# Patient Record
Sex: MALE | Race: WHITE | Employment: FULL TIME | URBAN - METROPOLITAN AREA
[De-identification: names, ages, dates, MRNs, and addresses within clinical notes are randomized per-mention and may not be internally consistent; named-entity substitution may affect disease eponyms.]

---

## 2017-04-20 ENCOUNTER — OFFICE VISIT (OUTPATIENT)
Dept: DERMATOLOGY | Age: 72
End: 2017-04-20

## 2017-04-20 DIAGNOSIS — L82.1 SK (SEBORRHEIC KERATOSIS): Primary | ICD-10-CM

## 2017-04-20 DIAGNOSIS — Z85.828 HISTORY OF BASAL CELL CARCINOMA: ICD-10-CM

## 2017-04-20 PROCEDURE — 99213 OFFICE O/P EST LOW 20 MIN: CPT | Performed by: DERMATOLOGY

## 2017-05-19 ENCOUNTER — TELEPHONE (OUTPATIENT)
Dept: FAMILY MEDICINE CLINIC | Age: 72
End: 2017-05-19

## 2017-05-19 RX ORDER — AMLODIPINE BESYLATE 5 MG/1
TABLET ORAL
Qty: 45 TABLET | Refills: 6 | Status: SHIPPED | OUTPATIENT
Start: 2017-05-19 | End: 2017-12-26 | Stop reason: SDUPTHER

## 2017-07-24 ENCOUNTER — TELEPHONE (OUTPATIENT)
Dept: CARDIOLOGY CLINIC | Age: 72
End: 2017-07-24

## 2017-07-24 DIAGNOSIS — I25.10 CORONARY ARTERY DISEASE INVOLVING NATIVE CORONARY ARTERY OF NATIVE HEART WITHOUT ANGINA PECTORIS: Primary | ICD-10-CM

## 2017-07-24 DIAGNOSIS — I10 ESSENTIAL HYPERTENSION: ICD-10-CM

## 2017-08-01 ENCOUNTER — HOSPITAL ENCOUNTER (OUTPATIENT)
Dept: NON INVASIVE DIAGNOSTICS | Age: 72
Discharge: HOME | End: 2017-08-01
Attending: INTERNAL MEDICINE | Admitting: INTERNAL MEDICINE

## 2017-08-10 ENCOUNTER — TELEPHONE (OUTPATIENT)
Dept: CARDIOLOGY CLINIC | Age: 72
End: 2017-08-10

## 2017-09-26 ENCOUNTER — TELEPHONE (OUTPATIENT)
Dept: FAMILY MEDICINE CLINIC | Age: 72
End: 2017-09-26

## 2017-10-05 ENCOUNTER — OFFICE VISIT (OUTPATIENT)
Dept: CARDIOLOGY CLINIC | Age: 72
End: 2017-10-05

## 2017-10-05 VITALS
WEIGHT: 170 LBS | SYSTOLIC BLOOD PRESSURE: 134 MMHG | DIASTOLIC BLOOD PRESSURE: 70 MMHG | BODY MASS INDEX: 25.1 KG/M2 | HEART RATE: 72 BPM

## 2017-10-05 DIAGNOSIS — I25.10 CORONARY ARTERY DISEASE INVOLVING NATIVE CORONARY ARTERY OF NATIVE HEART WITHOUT ANGINA PECTORIS: Primary | ICD-10-CM

## 2017-10-05 DIAGNOSIS — E78.5 HYPERLIPIDEMIA, UNSPECIFIED HYPERLIPIDEMIA TYPE: ICD-10-CM

## 2017-10-05 DIAGNOSIS — I10 ESSENTIAL HYPERTENSION: ICD-10-CM

## 2017-10-05 PROCEDURE — 99214 OFFICE O/P EST MOD 30 MIN: CPT | Performed by: INTERNAL MEDICINE

## 2017-10-05 NOTE — PROGRESS NOTES
LABVLDL 22 10/07/2016    LABVLDL 17 12/09/2015    LABVLDL 26 01/08/2014     Lipid  10/7/2016    Assessment:     Patient Active Problem List   Diagnosis    CAD (coronary artery disease)    Hyperlipidemia    Essential hypertension    Cellulitis and abscess of leg    Well adult exam    Skin lesion of face    BCC (basal cell carcinoma), face    History of AAA (abdominal aortic aneurysm) repair    Basal cell carcinoma of eye     Stress echocardiogram 8/2017    Summary   Baseline resting echocardiogram shows normal global LV systolic function   with an ejection fraction of 55% and uniform myocardial segmental wall   motion. Following stress there was uniform augmentation of all myocardial   segments with appropriate hyperdynamic LV systolic response to stress with   an ejection fraction of 65-70%.     Negative for ischemia. Assessment    CAD s/p CABG (LIMA to LAD; left radial to circumflex and obtuse marginal in 2001)    Hyperlipidemia      Hypertension essential      Plan:   Medications reviewed  Continue amlodipine  Check lipids; if LDL >70, will start rosuvastatin 20mg daily  Aspirin recommended; patient does not want to be on aspirin  All questions answered      Staff Note      Patient seen and evaluated with the medical resident. I was physically present during the critical portions of the service when performed by the resident including the assessment and management of the patient. I agree with the findings and plans as described. Not sure if he wants to take a statin.  This has been an issue in the past  Check LDL if high start statin

## 2017-10-17 DIAGNOSIS — E78.5 HYPERLIPIDEMIA, UNSPECIFIED HYPERLIPIDEMIA TYPE: ICD-10-CM

## 2017-10-17 LAB
CHOLESTEROL, TOTAL: 179 MG/DL (ref 0–199)
HDLC SERPL-MCNC: 35 MG/DL (ref 40–60)
LDL CHOLESTEROL CALCULATED: 119 MG/DL
TRIGL SERPL-MCNC: 127 MG/DL (ref 0–150)
VLDLC SERPL CALC-MCNC: 25 MG/DL

## 2017-10-26 ENCOUNTER — OFFICE VISIT (OUTPATIENT)
Dept: FAMILY MEDICINE CLINIC | Age: 72
End: 2017-10-26

## 2017-10-26 VITALS
OXYGEN SATURATION: 97 % | HEART RATE: 74 BPM | SYSTOLIC BLOOD PRESSURE: 110 MMHG | HEIGHT: 69 IN | BODY MASS INDEX: 25.03 KG/M2 | DIASTOLIC BLOOD PRESSURE: 70 MMHG | WEIGHT: 169 LBS

## 2017-10-26 DIAGNOSIS — E78.2 MIXED HYPERLIPIDEMIA: ICD-10-CM

## 2017-10-26 DIAGNOSIS — Z23 NEED FOR PNEUMOCOCCAL VACCINATION: ICD-10-CM

## 2017-10-26 DIAGNOSIS — I10 ESSENTIAL HYPERTENSION: ICD-10-CM

## 2017-10-26 DIAGNOSIS — I25.10 CORONARY ARTERY DISEASE INVOLVING NATIVE CORONARY ARTERY OF NATIVE HEART WITHOUT ANGINA PECTORIS: ICD-10-CM

## 2017-10-26 DIAGNOSIS — Z00.00 WELL ADULT EXAM: Primary | ICD-10-CM

## 2017-10-26 PROCEDURE — 99397 PER PM REEVAL EST PAT 65+ YR: CPT | Performed by: FAMILY MEDICINE

## 2017-10-26 PROCEDURE — 90471 IMMUNIZATION ADMIN: CPT | Performed by: FAMILY MEDICINE

## 2017-10-26 PROCEDURE — 90670 PCV13 VACCINE IM: CPT | Performed by: FAMILY MEDICINE

## 2017-10-26 RX ORDER — ATORVASTATIN CALCIUM 10 MG/1
10 TABLET, FILM COATED ORAL DAILY
Qty: 30 TABLET | Refills: 5 | Status: SHIPPED | OUTPATIENT
Start: 2017-10-26 | End: 2018-05-04 | Stop reason: SDUPTHER

## 2017-10-26 RX ORDER — NITROGLYCERIN 0.3 MG/1
0.3 TABLET SUBLINGUAL EVERY 5 MIN PRN
Qty: 30 TABLET | Refills: 3 | Status: SHIPPED | OUTPATIENT
Start: 2017-10-26

## 2017-10-26 ASSESSMENT — PATIENT HEALTH QUESTIONNAIRE - PHQ9
SUM OF ALL RESPONSES TO PHQ QUESTIONS 1-9: 0
1. LITTLE INTEREST OR PLEASURE IN DOING THINGS: 0
SUM OF ALL RESPONSES TO PHQ9 QUESTIONS 1 & 2: 0
2. FEELING DOWN, DEPRESSED OR HOPELESS: 0

## 2017-10-26 NOTE — PATIENT INSTRUCTIONS
Patient Self-Management Goal for Chronic Condition  Goal: I will follow the diet recommendations provided by my doctor: low fat, low cholesterol, substitute healthy fats, such as olive oil, canola oil, grapeseed oil for saturated fats like butter, margarine, and shortening, minimize processed foods and reduce sodium intake. I will take all medications as prescribed by my doctor, and I will call the office if I am having any medication problems.   Barriers to success: none  Plan for overcoming my barriers: N/A     Confidence: 9/10  Date goal set: 10/26/17  Date goal attained:

## 2017-10-26 NOTE — PROGRESS NOTES
Indications: Per PT      Flaxseed, Linseed, (FLAX SEED OIL) 1000 MG CAPS Take 1,000 mg by mouth daily      GARLIC PO Take by mouth      therapeutic multivitamin-minerals (THERAGRAN-M) tablet Take 1 tablet by mouth daily.  Vitamin D (CHOLECALCIFEROL) 1000 UNITS CAPS capsule Take 1,000 Units by mouth daily. Past Medical History:   Diagnosis Date    BCC (basal cell carcinoma)RT medial periorbital region     CAD (coronary artery disease)     arrested after AAA repair    Cellulitis and abscess of leg 7/18/2012    Cellulitis of hand 12/13/2010    Hyperlipidemia     Hypertension     MRSA infection 7/18/2012    LLE     Past Surgical History:   Procedure Laterality Date    ABDOMINAL AORTIC ANEURYSM REPAIR  01    CORONARY ARTERY BYPASS GRAFT  01    LEG DEBRIDEMENT  07/19/2012    EXCISIONAL DEBRIDEMENT OF LEFT LEG WOUND WITH WASHOUT     Family History   Problem Relation Age of Onset    Stroke Mother     Cancer Father      stomach    Heart Disease Father     High Cholesterol Sister     Cancer Sister      pancreatic    Heart Disease Brother      bypass X2    Heart Disease Brother     Arthritis Sister      SLE     Social History     Social History    Marital status: Single     Spouse name: N/A    Number of children: N/A    Years of education: N/A     Occupational History          Social History Main Topics    Smoking status: Former Smoker     Years: 35.00    Smokeless tobacco: Never Used    Alcohol use No    Drug use: Unknown    Sexual activity: Not Currently     Other Topics Concern    Not on file     Social History Narrative    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     Physical Exam:   Vitals:    10/26/17 1105   BP: 110/70   Pulse: 74   SpO2: 97%   Weight: 169 lb (76.7 kg)   Height: 5' 9\" (1.753 m)     Body mass index is 24.96 plan--TOS and SE discussed    Hyperlipidemia  Stable--same plan--TOS and SE    Well adult exam  Labs and prevnar    Bryson Garcia received counseling on the following healthy behaviors: nutrition and medication adherence    Patient given educational materials on Hyperlipidemia and Hypertension    I have instructed Bryson Garcia to complete a self tracking handout on Blood Pressures  and instructed them to bring it with them to his next appointment. Discussed use, benefit, and side effects of prescribed medications. Barriers to medication compliance addressed. All patient questions answered. Pt voiced understanding.

## 2017-10-27 ENCOUNTER — TELEPHONE (OUTPATIENT)
Dept: FAMILY MEDICINE CLINIC | Age: 72
End: 2017-10-27

## 2017-11-10 ENCOUNTER — TELEPHONE (OUTPATIENT)
Dept: FAMILY MEDICINE CLINIC | Age: 72
End: 2017-11-10

## 2017-11-10 RX ORDER — CYCLOBENZAPRINE HCL 5 MG
5 TABLET ORAL 3 TIMES DAILY PRN
Qty: 20 TABLET | Refills: 1 | Status: SHIPPED | OUTPATIENT
Start: 2017-11-10 | End: 2017-11-28 | Stop reason: SDUPTHER

## 2017-11-10 RX ORDER — NAPROXEN 500 MG/1
500 TABLET ORAL 2 TIMES DAILY WITH MEALS
Qty: 30 TABLET | Refills: 3 | Status: SHIPPED | OUTPATIENT
Start: 2017-11-10 | End: 2019-08-30

## 2017-11-10 NOTE — TELEPHONE ENCOUNTER
Patient says that he has shooting pain going down left leg. Is asking for a muscle relaxer for the pain. Is see a chiropractor.     Please advise  SMITHA [phone]

## 2017-11-28 RX ORDER — CYCLOBENZAPRINE HCL 5 MG
5 TABLET ORAL 3 TIMES DAILY PRN
Qty: 20 TABLET | Refills: 1 | Status: SHIPPED | OUTPATIENT
Start: 2017-11-28 | End: 2017-12-08

## 2017-12-26 RX ORDER — AMLODIPINE BESYLATE 5 MG/1
TABLET ORAL
Qty: 45 TABLET | Refills: 6 | Status: SHIPPED | OUTPATIENT
Start: 2017-12-26 | End: 2018-07-30 | Stop reason: SDUPTHER

## 2018-02-13 ENCOUNTER — TELEPHONE (OUTPATIENT)
Dept: CARDIOLOGY CLINIC | Age: 73
End: 2018-02-13

## 2018-05-04 RX ORDER — ATORVASTATIN CALCIUM 10 MG/1
TABLET, FILM COATED ORAL
Qty: 30 TABLET | Refills: 5 | Status: SHIPPED | OUTPATIENT
Start: 2018-05-04 | End: 2018-11-07 | Stop reason: SDUPTHER

## 2018-07-30 ENCOUNTER — TELEPHONE (OUTPATIENT)
Dept: FAMILY MEDICINE CLINIC | Age: 73
End: 2018-07-30

## 2018-07-30 RX ORDER — AMLODIPINE BESYLATE 5 MG/1
TABLET ORAL
Qty: 45 TABLET | Refills: 6 | Status: SHIPPED | OUTPATIENT
Start: 2018-07-30 | End: 2019-03-07 | Stop reason: SDUPTHER

## 2018-07-30 NOTE — TELEPHONE ENCOUNTER
[phone]   Mckinley Han    Requesting script for     LODIPine (NORVASC) 5 MG tablet  TAKE 1 AND 1/2 TABLETS BY MOUTH ONCE DAILY, Disp-45 tablet, [address] [place] [address] [phone] Sharlene Caldwell [phone]    Last seen for physical 10/26/2017    Please advise patient

## 2018-08-24 ENCOUNTER — OFFICE VISIT (OUTPATIENT)
Dept: FAMILY MEDICINE CLINIC | Age: 73
End: 2018-08-24

## 2018-08-24 VITALS
SYSTOLIC BLOOD PRESSURE: 120 MMHG | DIASTOLIC BLOOD PRESSURE: 80 MMHG | HEART RATE: 74 BPM | OXYGEN SATURATION: 95 % | HEIGHT: 69 IN | BODY MASS INDEX: 25.2 KG/M2 | WEIGHT: 170.1 LBS

## 2018-08-24 DIAGNOSIS — Z23 NEED FOR PNEUMOCOCCAL VACCINE: ICD-10-CM

## 2018-08-24 DIAGNOSIS — E55.9 VITAMIN D DEFICIENCY: ICD-10-CM

## 2018-08-24 DIAGNOSIS — I10 ESSENTIAL HYPERTENSION: ICD-10-CM

## 2018-08-24 DIAGNOSIS — E78.2 MIXED HYPERLIPIDEMIA: ICD-10-CM

## 2018-08-24 DIAGNOSIS — I25.10 CORONARY ARTERY DISEASE INVOLVING NATIVE CORONARY ARTERY OF NATIVE HEART WITHOUT ANGINA PECTORIS: ICD-10-CM

## 2018-08-24 DIAGNOSIS — Z00.00 WELL ADULT EXAM: Primary | ICD-10-CM

## 2018-08-24 LAB
A/G RATIO: 1.4 (ref 1.1–2.2)
ALBUMIN SERPL-MCNC: 4.7 G/DL (ref 3.4–5)
ALP BLD-CCNC: 56 U/L (ref 40–129)
ALT SERPL-CCNC: 18 U/L (ref 10–40)
ANION GAP SERPL CALCULATED.3IONS-SCNC: 11 MMOL/L (ref 3–16)
AST SERPL-CCNC: 27 U/L (ref 15–37)
BASOPHILS ABSOLUTE: 0 K/UL (ref 0–0.2)
BASOPHILS RELATIVE PERCENT: 0.5 %
BILIRUB SERPL-MCNC: 0.6 MG/DL (ref 0–1)
BUN BLDV-MCNC: 7 MG/DL (ref 7–20)
CALCIUM SERPL-MCNC: 9.3 MG/DL (ref 8.3–10.6)
CHLORIDE BLD-SCNC: 103 MMOL/L (ref 99–110)
CHOLESTEROL, TOTAL: 137 MG/DL (ref 0–199)
CO2: 28 MMOL/L (ref 21–32)
CREAT SERPL-MCNC: 0.6 MG/DL (ref 0.8–1.3)
EOSINOPHILS ABSOLUTE: 0.2 K/UL (ref 0–0.6)
EOSINOPHILS RELATIVE PERCENT: 3.9 %
GFR AFRICAN AMERICAN: >60
GFR NON-AFRICAN AMERICAN: >60
GLOBULIN: 3.3 G/DL
GLUCOSE BLD-MCNC: 106 MG/DL (ref 70–99)
HCT VFR BLD CALC: 45.8 % (ref 40.5–52.5)
HDLC SERPL-MCNC: 35 MG/DL (ref 40–60)
HEMOGLOBIN: 15.2 G/DL (ref 13.5–17.5)
LDL CHOLESTEROL CALCULATED: 82 MG/DL
LYMPHOCYTES ABSOLUTE: 1.5 K/UL (ref 1–5.1)
LYMPHOCYTES RELATIVE PERCENT: 36.6 %
MCH RBC QN AUTO: 33.8 PG (ref 26–34)
MCHC RBC AUTO-ENTMCNC: 33.3 G/DL (ref 31–36)
MCV RBC AUTO: 101.6 FL (ref 80–100)
MONOCYTES ABSOLUTE: 0.9 K/UL (ref 0–1.3)
MONOCYTES RELATIVE PERCENT: 21.2 %
NEUTROPHILS ABSOLUTE: 1.6 K/UL (ref 1.7–7.7)
NEUTROPHILS RELATIVE PERCENT: 37.8 %
PDW BLD-RTO: 13.8 % (ref 12.4–15.4)
PLATELET # BLD: 114 K/UL (ref 135–450)
PMV BLD AUTO: 9.3 FL (ref 5–10.5)
POTASSIUM SERPL-SCNC: 4.3 MMOL/L (ref 3.5–5.1)
RBC # BLD: 4.5 M/UL (ref 4.2–5.9)
SODIUM BLD-SCNC: 142 MMOL/L (ref 136–145)
TOTAL CK: 221 U/L (ref 39–308)
TOTAL PROTEIN: 8 G/DL (ref 6.4–8.2)
TRIGL SERPL-MCNC: 99 MG/DL (ref 0–150)
VITAMIN D 25-HYDROXY: 32.6 NG/ML
VLDLC SERPL CALC-MCNC: 20 MG/DL
WBC # BLD: 4.2 K/UL (ref 4–11)

## 2018-08-24 PROCEDURE — 90732 PPSV23 VACC 2 YRS+ SUBQ/IM: CPT | Performed by: FAMILY MEDICINE

## 2018-08-24 PROCEDURE — 99397 PER PM REEVAL EST PAT 65+ YR: CPT | Performed by: FAMILY MEDICINE

## 2018-08-24 PROCEDURE — G0009 ADMIN PNEUMOCOCCAL VACCINE: HCPCS | Performed by: FAMILY MEDICINE

## 2018-08-24 NOTE — PROGRESS NOTES
History and Physical      Janel Quick  YOB: 1945    Date of Service:  8/24/2018    Chief Complaint:   Janel Quick is a 72 y.o. male who presents for complete physical examination.     HPI: cpx  No cc    Wt Readings from Last 3 Encounters:   08/24/18 170 lb 1.6 oz (77.2 kg)   10/26/17 169 lb (76.7 kg)   10/05/17 170 lb (77.1 kg)     BP Readings from Last 3 Encounters:   08/24/18 120/80   10/26/17 110/70   10/05/17 134/70       Patient Active Problem List   Diagnosis    CAD (coronary artery disease)    Hyperlipidemia    Essential hypertension    Well adult exam    BCC (basal cell carcinoma), face    History of AAA (abdominal aortic aneurysm) repair    Basal cell carcinoma of eye       Preventive Care:  Health Maintenance   Topic Date Due    Hepatitis C screen  1945    Shingles Vaccine (1 of 2 - 2 Dose Series) 08/22/1997    Potassium monitoring  10/07/2017    Creatinine monitoring  10/07/2017    Colon cancer screen colonoscopy  11/09/2018 (Originally 8/22/1997)    Flu vaccine (1) 09/01/2018    DTaP/Tdap/Td vaccine (2 - Td) 11/09/2020    Lipid screen  10/17/2022    Pneumococcal low/med risk  Completed    AAA screen  Completed      Self-testicular exams: Yes  Sexual activity: none   Last eye exam: 2018, normal  Exercise: walks 5 time(s) per week  Seatbelt use: +  Lipid panel:    Lab Results   Component Value Date    CHOL 179 10/17/2017    TRIG 127 10/17/2017    HDL 35 (L) 10/17/2017    LDLCALC 119 (H) 10/17/2017       Living will: yes,   copy requested    Immunization History   Administered Date(s) Administered    Pneumococcal 13-valent Conjugate ([id number]) 10/26/2017    Pneumococcal Polysaccharide ([id number]) 08/24/2018    Tdap (Boostrix, Adacel) 11/09/2010       No Known Allergies  Outpatient Prescriptions Marked as Taking for the 8/24/18 encounter (Office Visit) with Jeremias Mnceil MD   Medication Sig Dispense Refill    amLODIPine (NORVASC) 5 MG tablet Topics Concern    Not on file     Social History Narrative    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 Physical Exam:   Vitals:    08/24/18 1016   BP: 120/80   Pulse: 74   SpO2: 95%   Weight: 170 lb 1.6 oz (77.2 kg)   Height: 5' 9\" (1.753 m)     Body mass index is 25.12 kg/m². Constitutional: He is oriented to person, place, and time. He appears well-developed and well-nourished. No distress. HEENT:   Head: Normocephalic and atraumatic. Right Ear: Tympanic membrane, external ear and ear canal normal.   Left Ear: Tympanic membrane, external ear and ear canal normal.   Nose: Nose normal.   Mouth/Throat: Oropharynx is clear and moist and mucous membranes are normal. No oropharyngeal exudate or posterior oropharyngeal erythema. He has no cervical adenopathy. Eyes: Conjunctivae and extraocular motions are normal. Pupils are equal, round, and reactive to light. Neck: Full passive range of motion without pain. Neck supple. No JVD present. Carotid bruit is not present. No mass and no thyromegaly present. Cardiovascular: Normal rate, regular rhythm, normal heart sounds and intact distal pulses. Exam reveals no gallop and no friction rub. No murmur heard. Pulmonary/Chest: Effort normal and breath sounds normal. No respiratory distress. He has no wheezes, rhonchi or rales. Abdominal: Soft, non-tender. Bowel sounds and aorta are normal. There is no organomegaly, mass or bruit. Genitourinary:  no inguinal lymphadenopathy. Musculoskeletal: Normal range of motion, no synovitis. He exhibits no edema. Neurological: He is alert and oriented to person, place, and time. He has normal reflexes. No cranial nerve deficit. Coordination normal.   Skin: Skin is warm, dry and intact. No suspicious lesions are noted. Psychiatric: He has a normal mood and affect.  His speech is normal and

## 2018-08-24 NOTE — PATIENT INSTRUCTIONS
Patient Self-Management Goal for Chronic Condition  Goal: I will follow the diet recommendations provided by my doctor: low fat, low cholesterol, substitute healthy fats, such as olive oil, canola oil, grapeseed oil for saturated fats like butter, margarine, and shortening and minimize processed foods. I will take all medications as prescribed by my doctor, and I will call the office if I am having any medication problems.   Barriers to success: none  Plan for overcoming my barriers: N/A     Confidence: 9/10  Date goal set: 8/24/18  Date goal attained:

## 2018-08-31 ENCOUNTER — TELEPHONE (OUTPATIENT)
Dept: FAMILY MEDICINE CLINIC | Age: 73
End: 2018-08-31

## 2018-11-07 RX ORDER — ATORVASTATIN CALCIUM 10 MG/1
TABLET, FILM COATED ORAL
Qty: 30 TABLET | Refills: 5 | Status: SHIPPED | OUTPATIENT
Start: 2018-11-07 | End: 2019-08-30

## 2019-03-07 RX ORDER — AMLODIPINE BESYLATE 5 MG/1
TABLET ORAL
Qty: 45 TABLET | Refills: 6 | Status: SHIPPED | OUTPATIENT
Start: 2019-03-07 | End: 2019-08-30

## 2019-08-30 ENCOUNTER — OFFICE VISIT (OUTPATIENT)
Dept: FAMILY MEDICINE CLINIC | Age: 74
End: 2019-08-30
Payer: MEDICARE

## 2019-08-30 VITALS
WEIGHT: 172.2 LBS | BODY MASS INDEX: 25.43 KG/M2 | SYSTOLIC BLOOD PRESSURE: 138 MMHG | DIASTOLIC BLOOD PRESSURE: 76 MMHG | OXYGEN SATURATION: 97 % | HEART RATE: 87 BPM

## 2019-08-30 DIAGNOSIS — I25.10 CORONARY ARTERY DISEASE INVOLVING NATIVE CORONARY ARTERY OF NATIVE HEART WITHOUT ANGINA PECTORIS: ICD-10-CM

## 2019-08-30 DIAGNOSIS — E78.2 MIXED HYPERLIPIDEMIA: ICD-10-CM

## 2019-08-30 DIAGNOSIS — I10 ESSENTIAL HYPERTENSION: ICD-10-CM

## 2019-08-30 PROCEDURE — 99213 OFFICE O/P EST LOW 20 MIN: CPT | Performed by: FAMILY MEDICINE

## 2019-08-30 PROCEDURE — G8419 CALC BMI OUT NRM PARAM NOF/U: HCPCS | Performed by: FAMILY MEDICINE

## 2019-08-30 PROCEDURE — 4040F PNEUMOC VAC/ADMIN/RCVD: CPT | Performed by: FAMILY MEDICINE

## 2019-08-30 PROCEDURE — G8599 NO ASA/ANTIPLAT THER USE RNG: HCPCS | Performed by: FAMILY MEDICINE

## 2019-08-30 PROCEDURE — 1036F TOBACCO NON-USER: CPT | Performed by: FAMILY MEDICINE

## 2019-08-30 PROCEDURE — 1123F ACP DISCUSS/DSCN MKR DOCD: CPT | Performed by: FAMILY MEDICINE

## 2019-08-30 PROCEDURE — G8427 DOCREV CUR MEDS BY ELIG CLIN: HCPCS | Performed by: FAMILY MEDICINE

## 2019-08-30 PROCEDURE — 3017F COLORECTAL CA SCREEN DOC REV: CPT | Performed by: FAMILY MEDICINE

## 2019-08-30 ASSESSMENT — PATIENT HEALTH QUESTIONNAIRE - PHQ9
SUM OF ALL RESPONSES TO PHQ9 QUESTIONS 1 & 2: 0
SUM OF ALL RESPONSES TO PHQ QUESTIONS 1-9: 0
1. LITTLE INTEREST OR PLEASURE IN DOING THINGS: 0
2. FEELING DOWN, DEPRESSED OR HOPELESS: 0
SUM OF ALL RESPONSES TO PHQ QUESTIONS 1-9: 0

## 2019-08-30 ASSESSMENT — ENCOUNTER SYMPTOMS
ABDOMINAL DISTENTION: 0
EYE REDNESS: 0
STRIDOR: 0
BLOOD IN STOOL: 0
BACK PAIN: 0
RHINORRHEA: 0
COUGH: 0
CHEST TIGHTNESS: 0
PHOTOPHOBIA: 0
ANAL BLEEDING: 0
EYE PAIN: 0
APNEA: 0
ABDOMINAL PAIN: 0
EYE DISCHARGE: 0
SHORTNESS OF BREATH: 0
SINUS PRESSURE: 0
FACIAL SWELLING: 0
WHEEZING: 0
CHOKING: 0
COLOR CHANGE: 0
EYE ITCHING: 0

## 2019-08-30 NOTE — PROGRESS NOTES
next appointment. Discussed use, benefit, and side effects of prescribed medications. Barriers to medication compliance addressed. All patient questions answered. Pt voiced understanding.

## 2019-10-17 ENCOUNTER — OFFICE VISIT (OUTPATIENT)
Dept: FAMILY MEDICINE CLINIC | Age: 74
End: 2019-10-17
Payer: MEDICARE

## 2019-10-17 VITALS
DIASTOLIC BLOOD PRESSURE: 80 MMHG | SYSTOLIC BLOOD PRESSURE: 138 MMHG | BODY MASS INDEX: 25.36 KG/M2 | OXYGEN SATURATION: 97 % | HEIGHT: 69 IN | WEIGHT: 171.2 LBS | HEART RATE: 93 BPM

## 2019-10-17 DIAGNOSIS — E78.2 MIXED HYPERLIPIDEMIA: ICD-10-CM

## 2019-10-17 DIAGNOSIS — I10 ESSENTIAL HYPERTENSION: ICD-10-CM

## 2019-10-17 DIAGNOSIS — Z00.00 WELL ADULT EXAM: ICD-10-CM

## 2019-10-17 DIAGNOSIS — I25.10 CORONARY ARTERY DISEASE INVOLVING NATIVE CORONARY ARTERY OF NATIVE HEART WITHOUT ANGINA PECTORIS: ICD-10-CM

## 2019-10-17 DIAGNOSIS — Z00.00 ROUTINE GENERAL MEDICAL EXAMINATION AT A HEALTH CARE FACILITY: Primary | ICD-10-CM

## 2019-10-17 DIAGNOSIS — Z00.00 ROUTINE GENERAL MEDICAL EXAMINATION AT A HEALTH CARE FACILITY: ICD-10-CM

## 2019-10-17 LAB
A/G RATIO: 1.4 (ref 1.1–2.2)
ALBUMIN SERPL-MCNC: 4.7 G/DL (ref 3.4–5)
ALP BLD-CCNC: 50 U/L (ref 40–129)
ALT SERPL-CCNC: 15 U/L (ref 10–40)
ANION GAP SERPL CALCULATED.3IONS-SCNC: 12 MMOL/L (ref 3–16)
AST SERPL-CCNC: 23 U/L (ref 15–37)
BASOPHILS ABSOLUTE: 0 K/UL (ref 0–0.2)
BASOPHILS RELATIVE PERCENT: 0.6 %
BILIRUB SERPL-MCNC: 0.7 MG/DL (ref 0–1)
BUN BLDV-MCNC: 12 MG/DL (ref 7–20)
CALCIUM SERPL-MCNC: 9.4 MG/DL (ref 8.3–10.6)
CHLORIDE BLD-SCNC: 101 MMOL/L (ref 99–110)
CHOLESTEROL, TOTAL: 166 MG/DL (ref 0–199)
CO2: 29 MMOL/L (ref 21–32)
CREAT SERPL-MCNC: 0.7 MG/DL (ref 0.8–1.3)
EOSINOPHILS ABSOLUTE: 0.1 K/UL (ref 0–0.6)
EOSINOPHILS RELATIVE PERCENT: 1.7 %
GFR AFRICAN AMERICAN: >60
GFR NON-AFRICAN AMERICAN: >60
GLOBULIN: 3.3 G/DL
GLUCOSE BLD-MCNC: 105 MG/DL (ref 70–99)
HCT VFR BLD CALC: 45.7 % (ref 40.5–52.5)
HDLC SERPL-MCNC: 38 MG/DL (ref 40–60)
HEMOGLOBIN: 15.3 G/DL (ref 13.5–17.5)
LDL CHOLESTEROL CALCULATED: 106 MG/DL
LYMPHOCYTES ABSOLUTE: 1.2 K/UL (ref 1–5.1)
LYMPHOCYTES RELATIVE PERCENT: 28.9 %
MCH RBC QN AUTO: 32.8 PG (ref 26–34)
MCHC RBC AUTO-ENTMCNC: 33.6 G/DL (ref 31–36)
MCV RBC AUTO: 97.7 FL (ref 80–100)
MONOCYTES ABSOLUTE: 0.9 K/UL (ref 0–1.3)
MONOCYTES RELATIVE PERCENT: 22.1 %
NEUTROPHILS ABSOLUTE: 1.9 K/UL (ref 1.7–7.7)
NEUTROPHILS RELATIVE PERCENT: 46.7 %
PDW BLD-RTO: 13.4 % (ref 12.4–15.4)
PLATELET # BLD: 91 K/UL (ref 135–450)
PMV BLD AUTO: 8.7 FL (ref 5–10.5)
POTASSIUM SERPL-SCNC: 4.5 MMOL/L (ref 3.5–5.1)
RBC # BLD: 4.68 M/UL (ref 4.2–5.9)
SODIUM BLD-SCNC: 142 MMOL/L (ref 136–145)
TOTAL PROTEIN: 8 G/DL (ref 6.4–8.2)
TRIGL SERPL-MCNC: 111 MG/DL (ref 0–150)
VLDLC SERPL CALC-MCNC: 22 MG/DL
WBC # BLD: 4.1 K/UL (ref 4–11)

## 2019-10-17 PROCEDURE — G8484 FLU IMMUNIZE NO ADMIN: HCPCS | Performed by: FAMILY MEDICINE

## 2019-10-17 PROCEDURE — G8599 NO ASA/ANTIPLAT THER USE RNG: HCPCS | Performed by: FAMILY MEDICINE

## 2019-10-17 PROCEDURE — 1123F ACP DISCUSS/DSCN MKR DOCD: CPT | Performed by: FAMILY MEDICINE

## 2019-10-17 PROCEDURE — 4040F PNEUMOC VAC/ADMIN/RCVD: CPT | Performed by: FAMILY MEDICINE

## 2019-10-17 PROCEDURE — 3017F COLORECTAL CA SCREEN DOC REV: CPT | Performed by: FAMILY MEDICINE

## 2019-10-17 PROCEDURE — G0439 PPPS, SUBSEQ VISIT: HCPCS | Performed by: FAMILY MEDICINE

## 2019-10-17 ASSESSMENT — PATIENT HEALTH QUESTIONNAIRE - PHQ9
SUM OF ALL RESPONSES TO PHQ QUESTIONS 1-9: 0
SUM OF ALL RESPONSES TO PHQ QUESTIONS 1-9: 0

## 2019-10-17 ASSESSMENT — LIFESTYLE VARIABLES: HOW OFTEN DO YOU HAVE A DRINK CONTAINING ALCOHOL: 0

## 2020-12-17 ENCOUNTER — OFFICE VISIT (OUTPATIENT)
Dept: FAMILY MEDICINE CLINIC | Age: 75
End: 2020-12-17
Payer: MEDICARE

## 2020-12-17 ENCOUNTER — HOSPITAL ENCOUNTER (OUTPATIENT)
Dept: GENERAL RADIOLOGY | Age: 75
Discharge: HOME | End: 2020-12-17
Payer: MEDICARE

## 2020-12-17 ENCOUNTER — HOSPITAL ENCOUNTER (OUTPATIENT)
Age: 75
Discharge: HOME | End: 2020-12-17
Payer: MEDICARE

## 2020-12-17 VITALS — BODY MASS INDEX: 25.92 KG/M2 | WEIGHT: 175 LBS | HEIGHT: 69 IN | TEMPERATURE: 97.7 F

## 2020-12-17 PROBLEM — M25.561 ACUTE PAIN OF RIGHT KNEE: Status: ACTIVE | Noted: 2020-12-17

## 2020-12-17 PROCEDURE — 4040F PNEUMOC VAC/ADMIN/RCVD: CPT | Performed by: NURSE PRACTITIONER

## 2020-12-17 PROCEDURE — 1036F TOBACCO NON-USER: CPT | Performed by: NURSE PRACTITIONER

## 2020-12-17 PROCEDURE — G8484 FLU IMMUNIZE NO ADMIN: HCPCS | Performed by: NURSE PRACTITIONER

## 2020-12-17 PROCEDURE — G8427 DOCREV CUR MEDS BY ELIG CLIN: HCPCS | Performed by: NURSE PRACTITIONER

## 2020-12-17 PROCEDURE — 99214 OFFICE O/P EST MOD 30 MIN: CPT | Performed by: NURSE PRACTITIONER

## 2020-12-17 PROCEDURE — G8419 CALC BMI OUT NRM PARAM NOF/U: HCPCS | Performed by: NURSE PRACTITIONER

## 2020-12-17 PROCEDURE — 3017F COLORECTAL CA SCREEN DOC REV: CPT | Performed by: NURSE PRACTITIONER

## 2020-12-17 PROCEDURE — 73562 X-RAY EXAM OF KNEE 3: CPT

## 2020-12-17 PROCEDURE — 1123F ACP DISCUSS/DSCN MKR DOCD: CPT | Performed by: NURSE PRACTITIONER

## 2020-12-17 ASSESSMENT — PATIENT HEALTH QUESTIONNAIRE - PHQ9
SUM OF ALL RESPONSES TO PHQ9 QUESTIONS 1 & 2: 0
SUM OF ALL RESPONSES TO PHQ QUESTIONS 1-9: 0
1. LITTLE INTEREST OR PLEASURE IN DOING THINGS: 0
SUM OF ALL RESPONSES TO PHQ QUESTIONS 1-9: 0
SUM OF ALL RESPONSES TO PHQ QUESTIONS 1-9: 0
2. FEELING DOWN, DEPRESSED OR HOPELESS: 0

## 2020-12-17 ASSESSMENT — ENCOUNTER SYMPTOMS
SHORTNESS OF BREATH: 0
COUGH: 0

## 2020-12-17 NOTE — PROGRESS NOTES
Subjective:      Patient ID: Kennedi Casanova is a 70 y.o. male who presents for:   Chief Complaint   Patient presents with    Knee Pain       Pt presents following mechanical fall down steps. Pt states hit R knee on concrete with most of his weight, did not hit head or lose concsiousness. Having pain with putting weight on R leg, ambulation, and bending R knee. Denies N/T, popping or clicking. Pt does report feeling of tightness, or muscle spasms, to R outer thigh. +swelling and bruising to knee. ROM decreased r/t pain. Pt has not tried any OTC meds for pain. Review of Systems   Respiratory: Negative for cough and shortness of breath. Cardiovascular: Negative for chest pain and palpitations. Musculoskeletal: Positive for gait problem and joint swelling. R knee   Neurological: Negative for dizziness, light-headedness and headaches. All other systems reviewed and are negative.     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     Family History   Problem Relation Age of Onset    Stroke Mother     Cancer Father         stomach    Heart Disease Father     High Cholesterol Sister     Cancer Sister         pancreatic    Heart Disease Brother         bypass X2    Heart Disease Brother     Arthritis Sister         SLE     Social History     Tobacco Use    Smoking status: Former Smoker Packs/day: 2.00     Years: 35.00     Pack years: 70.00     Quit date: 2001     Years since quittin.3    Smokeless tobacco: Never Used   Substance Use Topics    Alcohol use: No     No Known Allergies  Current Outpatient Medications   Medication Sig Dispense Refill    nitroGLYCERIN (NITROSTAT) 0.3 MG SL tablet Place 1 tablet under the tongue every 5 minutes as needed for Chest pain up to max of 3 total doses. If no relief after 1 dose, call 911. 30 tablet 3    Ascorbic Acid (VITAMIN C) 500 MG tablet Take 2,000 mg by mouth daily Indications: Per PT      Flaxseed, Linseed, (FLAX SEED OIL) 1000 MG CAPS Take 1,000 mg by mouth daily      GARLIC PO Take by mouth      therapeutic multivitamin-minerals (THERAGRAN-M) tablet Take 1 tablet by mouth daily.  Vitamin D (CHOLECALCIFEROL) 1000 UNITS CAPS capsule Take 1,000 Units by mouth daily. No current facility-administered medications for this visit. Patient's past medical history, surgical history, family history, medications,  andallergies  were all reviewed and updated as appropriate today. Objective:     Vitals:    20 1155   Temp: 97.7 °F (36.5 °C)     Physical Exam  Vitals signs reviewed. Constitutional:       Appearance: Normal appearance. Cardiovascular:      Rate and Rhythm: Normal rate and regular rhythm. Pulses: Normal pulses. Pulmonary:      Effort: Pulmonary effort is normal.      Breath sounds: Normal breath sounds. Musculoskeletal:         General: Swelling, tenderness and signs of injury present. Right knee: He exhibits decreased range of motion, swelling and ecchymosis. Tenderness found. Legs:    Skin:     Findings: Bruising and signs of injury present. Neurological:      Mental Status: He is alert. Mental status is at baseline. Assessment      Encounter Diagnosis   Name Primary?     Acute pain of right knee Yes       PLAN:  Health Maintenance   Topic Date Due  Hepatitis C screen  1945    Shingles Vaccine (1 of 2) 08/22/1997    Colon cancer screen colonoscopy  08/22/1997    Annual Wellness Visit (AWV)  06/23/2019    Flu vaccine (1) 09/01/2020    Potassium monitoring  10/17/2020    Creatinine monitoring  10/17/2020    DTaP/Tdap/Td vaccine (2 - Td) 11/09/2020    Lipid screen  10/17/2024    Pneumococcal 65+ years Vaccine  Completed    AAA screen  Completed    Hepatitis A vaccine  Aged Out    Hepatitis B vaccine  Aged Out    Hib vaccine  Aged Out    Meningococcal (ACWY) vaccine  Aged Out     1. Acute pain of right knee  - XR KNEE RIGHT (3 VIEWS); Future  Ref to orthopedics pending result - will call  Instructed on RICE, brace as needed for comfort    Return if symptoms worsen or fail to improve.     ALONDRA Portillo - CNP  [place]  12/17/2020

## 2020-12-18 ENCOUNTER — TELEPHONE (OUTPATIENT)
Dept: FAMILY MEDICINE CLINIC | Age: 75
End: 2020-12-18

## 2020-12-18 ENCOUNTER — TELEPHONE (OUTPATIENT)
Dept: ORTHOPEDIC SURGERY | Age: 75
End: 2020-12-18

## 2020-12-18 NOTE — TELEPHONE ENCOUNTER
----- Message from Gen Olvera sent at 12/18/2020  7:10 AM EST -----  Subject: Results Request    QUESTIONS  Which lab or imaging result is the patient calling about? X-ray results  Which provider ordered the test? Gaurav Castillo   At what location was the test performed? Date the test was performed? 2020-12-17  Additional Information for Provider? pt in extreme pain   wants results to xray and states he is in unimaginable pain   wont be able to come back into the office   would like to be contacted as quickly as possible  ---------------------------------------------------------------------------  --------------  [address]  What is the best way for the office to contact you? OK to leave message on   voicemail  Preferred Call Back Phone Number?  [phone]

## 2020-12-18 NOTE — TELEPHONE ENCOUNTER
----- Message from [employer] sent at 12/18/2020  8:31 AM EST -----  Subject: Results Request    QUESTIONS  Which lab or imaging result is the patient calling about? imaging of knee  Which provider ordered the test? Carolin Mercado   At what location was the test performed? Date the test was performed? 2020-12-17  Additional Information for Provider? Patient would like results of his   knee X-ray he is in a lot off pain.   ---------------------------------------------------------------------------  --------------  CALL BACK INFO  What is the best way for the office to contact you? OK to leave message on   voicemail  Preferred Call Back Phone Number?  [phone]

## 2020-12-22 ENCOUNTER — OFFICE VISIT (OUTPATIENT)
Dept: ORTHOPEDIC SURGERY | Age: 75
End: 2020-12-22
Payer: MEDICARE

## 2020-12-22 VITALS
HEIGHT: 69 IN | SYSTOLIC BLOOD PRESSURE: 130 MMHG | TEMPERATURE: 98.8 F | HEART RATE: 78 BPM | BODY MASS INDEX: 25.92 KG/M2 | WEIGHT: 175 LBS | DIASTOLIC BLOOD PRESSURE: 77 MMHG

## 2020-12-22 PROCEDURE — 3017F COLORECTAL CA SCREEN DOC REV: CPT | Performed by: PHYSICIAN ASSISTANT

## 2020-12-22 PROCEDURE — G8417 CALC BMI ABV UP PARAM F/U: HCPCS | Performed by: PHYSICIAN ASSISTANT

## 2020-12-22 PROCEDURE — G8484 FLU IMMUNIZE NO ADMIN: HCPCS | Performed by: PHYSICIAN ASSISTANT

## 2020-12-22 PROCEDURE — 1036F TOBACCO NON-USER: CPT | Performed by: PHYSICIAN ASSISTANT

## 2020-12-22 PROCEDURE — 99204 OFFICE O/P NEW MOD 45 MIN: CPT | Performed by: PHYSICIAN ASSISTANT

## 2020-12-22 PROCEDURE — G8427 DOCREV CUR MEDS BY ELIG CLIN: HCPCS | Performed by: PHYSICIAN ASSISTANT

## 2020-12-22 PROCEDURE — 4040F PNEUMOC VAC/ADMIN/RCVD: CPT | Performed by: PHYSICIAN ASSISTANT

## 2020-12-22 PROCEDURE — 1123F ACP DISCUSS/DSCN MKR DOCD: CPT | Performed by: PHYSICIAN ASSISTANT

## 2020-12-22 PROCEDURE — L1830 KO IMMOB CANVAS LONG PRE OTS: HCPCS | Performed by: PHYSICIAN ASSISTANT

## 2020-12-22 NOTE — PROGRESS NOTES
Socioeconomic History    Marital status: Single     Spouse name: Not on file    Number of children: Not on file    Years of education: Not on file    Highest education level: Not on file   Occupational History    Occupation:    Social Needs    Financial resource strain: Not on file    Food insecurity     Worry: Not on file     Inability: Not on file    Transportation needs     Medical: Not on file     Non-medical: Not on file   Tobacco Use    Smoking status: Former Smoker     Packs/day: 2.00     Years: 35.00     Pack years: 70.00     Quit date: 2001     Years since quittin.3    Smokeless tobacco: Never Used   Substance and Sexual Activity    Alcohol use: No    Drug use: No    Sexual activity: Not Currently   Lifestyle    Physical activity     Days per week: Not on file     Minutes per session: Not on file    Stress: Not on file   Relationships    Social connections     Talks on phone: Not on file     Gets together: Not on file     Attends [employer] service: Not on file     Active member of club or organization: Not on file     Attends meetings of clubs or organizations: Not on file     Relationship status: Not on file    Intimate partner violence     Fear of current or ex partner: Not on file     Emotionally abused: Not on file     Physically abused: Not on file     Forced sexual activity: Not on file   Other Topics Concern    Not on file   Social History Narrative    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 No distress. ____  HENT:   Head: Normocephalic and atraumatic. ____  Eyes: Conjunctivae are normal. ________  Cardiovascular: Intact distal pulses. ____  Pulmonary/Chest: Effort normal. ________________________  Neurological: She is alert and oriented to person, place, and time.  ____ Skin: Skin is dry. She is not diaphoretic. ____  Psychiatric: Mood, affect and judgment normal. ______          Assessment   Vital Signs:      Vitals:    12/22/20 1230   BP: 130/77   Pulse: 78   Temp: 98.8 °F (37.1 °C)       right Knee shows evidence for DJD with no obvious pseudolaxity, pain with weight bearing, antalgic gait and palpable osteophytes. Significant contusion    Inspection: Moderate anterior swelling. Swelling is present with moderate effusion. The posterior aspect of the knee appears to be full with tenderness. There is no erythema, rash, or ecchymosis. Range of Motion:  Right flexion can get to 100 unable to perform any extension on his own with assistance can get to 0. Left 0 to 130 degrees     Pain with flexion extension testing as well as varus and valgus testing    There is deformity no noted    Strength:  Hamstrings rated: 4/5. Quadriceps rated: 0/5    Palpation: There is moderate tenderness along the quadriceps tendon proximal patellar attachment. Special Tests: Patellar Compression test is positive. Valgus & Varus test is positive. Skin: There are no rashes, ulcerations or lesions. Gait: Gait pattern is antalgic  Skin shows no rashes/ecchymosis to the affected area, no hyperesthesias, no discoloration, no temperature or color discrepancies. NEUROLOGICALLY: There is no evidence for sensory or motor deficits in the extremity. Coordination appears full with no spacticity or rigidity. Reflexes appear to be symmetric. Distal circulation intact. No signs of RSD.        Additional Comments: Diffuse contusions noted throughout entire lower extremity on the right side up into the pelvis tenderness to palpation in the inguinal area         Procedure(s): No new procedure performed at today's date    Diagnostic Test Findings:   Xray   Have reviewed the xrays above from 12/17/20 Three-view x-ray of the right knee AP, lateral and sunrise views were performed on 12/17/2020 and my impression is: No evidence of acute fracture dislocation significant hematoma noted to proximal patellar area and concerns for quadriceps tear    Assessment and Plan:       Diagnosis Orders   1. Acute pain of right knee  MRI KNEE RIGHT WO CONTRAST    Breg Knee Immobilizer Brace   2. History of abdominal aortic aneurysm (AAA)  CT PELVIS WO CONTRAST Additional Contrast? None              The orders below, if any, were placed during this visit:   Orders Placed This Encounter   Procedures    MRI KNEE RIGHT WO CONTRAST     Standing Status:   Future     Standing Expiration Date:   12/22/2021     Scheduling Instructions:      Schedule with [facility] Once approved     Order Specific Question:   Reason for exam:     Answer:   rule out quad tear    CT PELVIS WO CONTRAST Additional Contrast? None     Standing Status:   Future     Standing Expiration Date:   12/22/2021     Scheduling Instructions:      Scheduled with [facility] once approved     Order Specific Question:   Additional Contrast?     Answer:   None     Order Specific Question:   Reason for exam:     Answer:   rule out damage to AAA repair    Breg Knee Immobilizer Brace     Patient was prescribed a Breg Knee Immobilizer. The right knee will require stabilization / immobilization from this semi-rigid / rigid orthosis to improve their function. The orthosis will assist in protecting the affected area, provide functional support and facilitate healing. The prefabricated orthosis was modified in the following manner to provide a customizable fit for the patient at the time of delivery. 1. Identification of appropriate positioning and alignment of anatomical landmarks. 2. Trimming of straps and panels. Reassemble orthosis to specifically fit patient. The patient was educated and fit by a healthcare professional with expert knowledge and specialization in brace application while under the direct supervision of the treating physician. Verbal and written instructions for the use of and application of this item were provided. They were instructed to contact the office immediately should the brace result in increased pain, decreased sensation, increased swelling or worsening of the condition. Treatment Plan:   -Ordered MRI of the right knee for evaluation of quadriceps tear.  -Ordered CT scan of the pelvis to help rule out any fracture to the AAA repair from 20 years ago. -Place patient into an immobilizer knee brace and advised to continue utilization of walker and the brace on any point in time when he is walking or standing.  -Advised patient to rest ice and elevate as much as possible and continue utilization of Tylenol on an as-needed basis did advise avoidance of NSAIDs to help reduce overall bleeding risk. -Advised patient follow-up after MRI is performed to discuss results and further treatment management options.  -Discussed with patient that the likelihood is that the quadriceps is torn and that surgical intervention will be required. -Gave patient a note to be out of work until further evaluation can be completed.          KELSEY Johns

## 2020-12-22 NOTE — LETTER
[facility]  [address] [address]  Phone: [phone]  Fax: [phone]    [place], [place]        December 22, 2020     Patient: Carmen Mary   YOB: 1945   Date of Visit: 12/22/2020       To Whom It May Concern: It is my medical opinion that Reuben Whitney should remain out of work until re-evaluation is completed. If you have any questions or concerns, please don't hesitate to call.     Sincerely,        Gladis Martino MD

## 2020-12-24 ENCOUNTER — OFFICE VISIT (OUTPATIENT)
Dept: PRIMARY CARE CLINIC | Age: 75
End: 2020-12-24
Payer: MEDICARE

## 2020-12-24 ENCOUNTER — TELEPHONE (OUTPATIENT)
Dept: ORTHOPEDIC SURGERY | Age: 75
End: 2020-12-24

## 2020-12-24 LAB — SARS-COV-2: NOT DETECTED

## 2020-12-24 PROCEDURE — G8428 CUR MEDS NOT DOCUMENT: HCPCS | Performed by: NURSE PRACTITIONER

## 2020-12-24 PROCEDURE — 99211 OFF/OP EST MAY X REQ PHY/QHP: CPT | Performed by: NURSE PRACTITIONER

## 2020-12-24 PROCEDURE — G8417 CALC BMI ABV UP PARAM F/U: HCPCS | Performed by: NURSE PRACTITIONER

## 2020-12-27 ENCOUNTER — HOSPITAL ENCOUNTER (OUTPATIENT)
Dept: MRI IMAGING | Age: 75
Discharge: HOME | End: 2020-12-27
Payer: MEDICARE

## 2020-12-27 PROCEDURE — 73721 MRI JNT OF LWR EXTRE W/O DYE: CPT

## 2020-12-29 ENCOUNTER — OFFICE VISIT (OUTPATIENT)
Dept: ORTHOPEDIC SURGERY | Age: 75
End: 2020-12-29
Payer: MEDICARE

## 2020-12-29 VITALS
HEIGHT: 69 IN | HEART RATE: 67 BPM | WEIGHT: 175 LBS | TEMPERATURE: 97 F | DIASTOLIC BLOOD PRESSURE: 86 MMHG | SYSTOLIC BLOOD PRESSURE: 139 MMHG | BODY MASS INDEX: 25.92 KG/M2

## 2020-12-29 DIAGNOSIS — M25.561 ACUTE PAIN OF RIGHT KNEE: Primary | ICD-10-CM

## 2020-12-29 PROCEDURE — 3017F COLORECTAL CA SCREEN DOC REV: CPT | Performed by: ORTHOPAEDIC SURGERY

## 2020-12-29 PROCEDURE — 4040F PNEUMOC VAC/ADMIN/RCVD: CPT | Performed by: ORTHOPAEDIC SURGERY

## 2020-12-29 PROCEDURE — G8427 DOCREV CUR MEDS BY ELIG CLIN: HCPCS | Performed by: ORTHOPAEDIC SURGERY

## 2020-12-29 PROCEDURE — 1123F ACP DISCUSS/DSCN MKR DOCD: CPT | Performed by: ORTHOPAEDIC SURGERY

## 2020-12-29 PROCEDURE — 99213 OFFICE O/P EST LOW 20 MIN: CPT | Performed by: ORTHOPAEDIC SURGERY

## 2020-12-29 PROCEDURE — G8484 FLU IMMUNIZE NO ADMIN: HCPCS | Performed by: ORTHOPAEDIC SURGERY

## 2020-12-29 PROCEDURE — G8417 CALC BMI ABV UP PARAM F/U: HCPCS | Performed by: ORTHOPAEDIC SURGERY

## 2020-12-29 PROCEDURE — 1036F TOBACCO NON-USER: CPT | Performed by: ORTHOPAEDIC SURGERY

## 2020-12-29 NOTE — LETTER
[facility] Katie Santos  [address] [address]  Phone: [phone]  Fax: [phone]    Trudy Aguayo MD        December 29, 2020      Patient was seen in the office for leg injury. 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          Sincerely,        Trudy Aguayo MD

## 2020-12-29 NOTE — PROGRESS NOTES
Patient Name: Jn Nunez  : 1945  DOS: 2020        Chief Complaint:   Chief Complaint   Patient presents with    Knee Pain     RT KNEE-MRI RESULTS   better overall since the previous week. Better ability to extend knee fully. Flexing now to 90 degrees. Swelling decreased with brusing extending down the leg. MRI returned with partial tear of the quad namely the intermedius with retraction and partions of the vastus medialis. History of Present Illness:  Jn Nunez is a 70 y.o. male who presents with a chief complaint of continued complaints of pain in the knee with irritation with walking, stairs and with overuse. Pain in the knee regularly with swelling and discomfort. Increase in pain over the past several days time. 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 He developed significant pain and swelling in the knee as well as bruising up into the thigh and pelvic area and down into the lower leg. He is noting significant difficulty with his ability to ambulate he did not go to the emergency room but did go to his primary care provider who did x-rays noted no evidence of acute fracture dislocation but with the significance in the swelling and persistent pain advised for him to follow-up with orthopedics. He notes significant difficulty with ability to extend the knee incapable of doing it on his own has to use either his hands or the other foot to push to 1 straight. Utilizing a walker to help with ambulation. Denies numbness burning tingling radiating pains down the leg. Patient also has a history of AAA repair 19 years ago on the right side in the inguinal area as well and he was told by his surgeon that if he ever feels any pressure or discomfort in that groin area that he should get that evaluated immediately. He notes he is feeling that he is not feeling the same symptoms that he was when it started to rupture 19 years ago but is obviously very concerned for this. Medical History  Current Medications:   Prior to Admission medications    Medication Sig Start Date End Date Taking? Authorizing Provider   nitroGLYCERIN (NITROSTAT) 0.3 MG SL tablet Place 1 tablet under the tongue every 5 minutes as needed for Chest pain up to max of 3 total doses. If no relief after 1 dose, call 911. 10/26/17   Castro Stern MD   Ascorbic Acid (VITAMIN C) 500 MG tablet Take 2,000 mg by mouth daily Indications: Per PT    Historical Provider, MD   Flaxseed, Linseed, (FLAX SEED OIL) 1000 MG CAPS Take 1,000 mg by mouth daily    Historical Provider, MD   GARLIC PO Take by mouth    Historical Provider, MD   therapeutic multivitamin-minerals (THERAGRAN-M) tablet Take 1 tablet by mouth daily. Historical Provider, MD   Vitamin D (CHOLECALCIFEROL) 1000 UNITS CAPS capsule Take 1,000 Units by mouth daily. Historical Provider, MD     Allergies: No Known Allergies    Review of Systems:     Review of Systems ______  Constitutional: Negative for fever and diaphoresis. ____________  Respiratory: Negative for shortness of breath.  ________  Gastrointestinal: Negative for abdominal pain. ________  Musculoskeletal: Positive for joint pain. ____  Skin: Negative for itching. ____  Neurological: Negative for loss of consciousness.  ______________  All other systems reviewed and negative     Past Medical History:   Diagnosis Date    BCC (basal cell carcinoma)RT medial periorbital region     CAD (coronary artery disease)     arrested after AAA repair    Cellulitis and abscess of leg 7/18/2012  Cellulitis of hand 2010    Hyperlipidemia     Hypertension     MRSA infection 2012    LLE     Family History   Problem Relation Age of Onset    Stroke Mother     Cancer Father         stomach    Heart Disease Father     High Cholesterol Sister     Cancer Sister         pancreatic    Heart Disease Brother         bypass X2    Heart Disease Brother     Arthritis Sister         SLE     Social History     Socioeconomic History    Marital status: Single     Spouse name: Not on file    Number of children: Not on file    Years of education: Not on file    Highest education level: Not on file   Occupational History    Occupation:    Social Needs    Financial resource strain: Not on file    Food insecurity     Worry: Not on file     Inability: Not on file    Transportation needs     Medical: Not on file     Non-medical: Not on file   Tobacco Use    Smoking status: Former Smoker     Packs/day: 2.00     Years: 35.00     Pack years: 70.00     Quit date: 2001     Years since quittin.3    Smokeless tobacco: Never Used   Substance and Sexual Activity    Alcohol use: No    Drug use: No    Sexual activity: Not Currently   Lifestyle    Physical activity     Days per week: Not on file     Minutes per session: Not on file    Stress: Not on file   Relationships    Social connections     Talks on phone: Not on file     Gets together: Not on file     Attends Religion service: Not on file     Active member of club or organization: Not on file     Attends meetings of clubs or organizations: Not on file     Relationship status: Not on file    Intimate partner violence     Fear of current or ex partner: Not on file     Emotionally abused: Not on file     Physically abused: Not on file     Forced sexual activity: Not on file   Other Topics Concern    Not on file   Social History Narrative    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 No distress. ____  HENT:   Head: Normocephalic and atraumatic. ____  Eyes: Conjunctivae are normal. ________  Cardiovascular: Intact distal pulses. ____  Pulmonary/Chest: Effort normal. ________________________  Neurological: She is alert and oriented to person, place, and time. ____  Skin: Skin is dry. She is not diaphoretic. ____  Psychiatric: Mood, affect and judgment normal. ______          Assessment   Vital Signs:      Vitals:    12/29/20 0913   BP: 139/86   Pulse: 67   Temp: 97 °F (36.1 °C)       right Knee shows evidence for DJD with no obvious pseudolaxity, pain with weight bearing, antalgic gait and palpable osteophytes. Significant contusion    Inspection: Moderate anterior swelling. Swelling is present with moderate effusion. The posterior aspect of the knee appears to be full with tenderness. There is no erythema, rash, or ecchymosis. Range of Motion:  Right flexion can get to 100 able to perform  extension on his own with no assistance can get to 0. Left 0 to 130 degrees     Pain with flexion extension testing as well as varus and valgus testing    There is deformity no noted    Strength:  Hamstrings rated: 4/5. Quadriceps rated: 0/5    Palpation: There is moderate tenderness along the quadriceps tendon proximal patellar attachment. Special Tests: Patellar Compression test is positive. Valgus & Varus test is positive. Skin: There are no rashes, ulcerations or lesions. Gait: Gait pattern is antalgic  Skin shows no rashes/ecchymosis to the affected area, no hyperesthesias, no discoloration, no temperature or color discrepancies. NEUROLOGICALLY: There is no evidence for sensory or motor deficits in the extremity. Coordination appears full with no spacticity or rigidity. Reflexes appear to be symmetric. Distal circulation intact. No signs of RSD. Additional Comments: Diffuse contusions noted throughout entire lower extremity on the right side up into the pelvis tenderness to palpation in the inguinal area         Procedure(s): No new procedure performed at today's date    Diagnostic Test Findings:   Xray   Have reviewed the xrays above from 12/17/20   Three-view x-ray of the right knee AP, lateral and sunrise views were performed on 12/17/2020 and my impression is: No evidence of acute fracture dislocation significant hematoma noted to proximal patellar area and concerns for quadriceps tear    Assessment and Plan:       Diagnosis Orders   1. Acute pain of right knee                The orders below, if any, were placed during this visit:   No orders of the defined types were placed in this encounter. Treatment Plan:      -Ordered CT scan of the pelvis to help rule out any fracture to the AAA repair from 20 years ago. -Place patient into an immobilizer knee brace and advised to continue utilization of walker and the brace on any point in time when he is walking or standing.    -Advised patient to rest ice and elevate as much as possible and continue utilization of Tylenol on an as-needed basis did advise avoidance of NSAIDs to help reduce overall bleeding risk. -Gave patient a note to be out of work until further evaluation can be completed. rechceck in another week for evaluation regarding quad control and ability to tolerate extension bracing. Surgery discussed and patient would rather consider nonoperative treatment. Have discussed weakness of the leg and functional as well as cosmetic changes.           Kash Salas MD

## 2020-12-29 NOTE — LETTER
[facility]  [address] [address]  Phone: [phone]  Fax: [phone]    Nara Garcia MD        December 29, 2020     Patient: Yuan Law   YOB: 1945   Date of Visit: 12/29/2020       To Whom It May Concern:    Patient was seen in office for leg injury. Given his work detail, it is my medical opinion that Mel Callahan should remain out of work until 03/29/2021. If you have any questions or concerns, please don't hesitate to call.     Sincerely,      Nara Garcia MD

## 2021-01-13 ENCOUNTER — OFFICE VISIT (OUTPATIENT)
Dept: ORTHOPEDIC SURGERY | Age: 76
End: 2021-01-13
Payer: MEDICARE

## 2021-01-13 VITALS
HEART RATE: 67 BPM | DIASTOLIC BLOOD PRESSURE: 107 MMHG | HEIGHT: 69 IN | TEMPERATURE: 97 F | SYSTOLIC BLOOD PRESSURE: 178 MMHG | WEIGHT: 175 LBS | BODY MASS INDEX: 25.92 KG/M2

## 2021-01-13 DIAGNOSIS — M25.561 ACUTE PAIN OF RIGHT KNEE: Primary | ICD-10-CM

## 2021-01-13 DIAGNOSIS — Z86.79 HISTORY OF ABDOMINAL AORTIC ANEURYSM (AAA): ICD-10-CM

## 2021-01-13 PROCEDURE — G8484 FLU IMMUNIZE NO ADMIN: HCPCS | Performed by: PHYSICIAN ASSISTANT

## 2021-01-13 PROCEDURE — G8427 DOCREV CUR MEDS BY ELIG CLIN: HCPCS | Performed by: PHYSICIAN ASSISTANT

## 2021-01-13 PROCEDURE — 4040F PNEUMOC VAC/ADMIN/RCVD: CPT | Performed by: PHYSICIAN ASSISTANT

## 2021-01-13 PROCEDURE — 99213 OFFICE O/P EST LOW 20 MIN: CPT | Performed by: PHYSICIAN ASSISTANT

## 2021-01-13 PROCEDURE — G8417 CALC BMI ABV UP PARAM F/U: HCPCS | Performed by: PHYSICIAN ASSISTANT

## 2021-01-13 PROCEDURE — 3017F COLORECTAL CA SCREEN DOC REV: CPT | Performed by: PHYSICIAN ASSISTANT

## 2021-01-13 PROCEDURE — 1036F TOBACCO NON-USER: CPT | Performed by: PHYSICIAN ASSISTANT

## 2021-01-13 PROCEDURE — 1123F ACP DISCUSS/DSCN MKR DOCD: CPT | Performed by: PHYSICIAN ASSISTANT

## 2021-01-13 NOTE — PROGRESS NOTES
Patient Name: Daphney Balderas  : 1945  DOS: 2021        Chief Complaint:   Chief Complaint   Patient presents with    Leg Pain     rt quad           History of Present Illness:  Daphney Balderas is a 70 y.o. male who presents with a chief complaint of continued complaints of pain in the knee with irritation with walking, stairs and with overuse. Pain in the knee regularly with swelling and discomfort. Currently: 2021  Patient is here for follow-up regarding right quadriceps tear and right knee pain. Patient notes significant improvement in his overall function as well as pain rates his pain is a 0 out of 10 and he is noting increasing control over the knee and reduced instability. He denies buckling give way fall trauma or injury denies numbness burning tingling radiating pains down the leg. Notes that majority of the swelling has decreased significantly and the bruising has almost faded completely. He still notes some mild weakness but notes that this is improving dramatically since 1 month prior. He is very thankful that surgical intervention was not required and is hopeful that he can return to work as a  soon. He is wanting to know what exercises he should be and can be doing on his own to help improve his strength and function. No longer utilizing medications to help with pain is a has very minimal pain at best.      Previously: 2020  better overall since the previous week. Better ability to extend knee fully. Flexing now to 90 degrees. Swelling decreased with brusing extending down the leg. MRI returned with partial tear of the quad namely the intermedius with retraction and partions of the vastus medialis. Previously: 2020  Increase in pain over the past several days time. Flaxseed, Linseed, (FLAX SEED OIL) 1000 MG CAPS Take 1,000 mg by mouth daily    Historical Provider, MD   GARLIC PO Take by mouth    Historical Provider, MD   therapeutic multivitamin-minerals (THERAGRAN-M) tablet Take 1 tablet by mouth daily. Historical Provider, MD   Vitamin D (CHOLECALCIFEROL) 1000 UNITS CAPS capsule Take 1,000 Units by mouth daily. Historical Provider, MD     Allergies: No Known Allergies    Review of Systems:     Review of Systems ______  Constitutional: Negative for fever and diaphoresis. ____________  Respiratory: Negative for shortness of breath.  ________  Gastrointestinal: Negative for abdominal pain. ________  Musculoskeletal: Positive for joint pain. ____  Skin: Negative for itching. ____  Neurological: Negative for loss of consciousness.  ______________  All other systems reviewed and negative     Past Medical History:   Diagnosis Date    BCC (basal cell carcinoma)RT medial periorbital region     CAD (coronary artery disease)     arrested after AAA repair    Cellulitis and abscess of leg 7/18/2012    Cellulitis of hand 12/13/2010    Hyperlipidemia     Hypertension     MRSA infection 7/18/2012    LLE     Family History   Problem Relation Age of Onset    Stroke Mother     Cancer Father         stomach    Heart Disease Father     High Cholesterol Sister     Cancer Sister         pancreatic    Heart Disease Brother         bypass X2    Heart Disease Brother     Arthritis Sister         SLE     Social History     Socioeconomic History    Marital status: Single     Spouse name: Not on file    Number of children: Not on file    Years of education: Not on file    Highest education level: Not on file   Occupational History    Occupation:    Social Needs    Financial resource strain: Not on file    Food insecurity     Worry: Not on file     Inability: Not on file    Transportation needs     Medical: Not on file     Non-medical: Not on file   Tobacco Use  Smoking status: Former Smoker     Packs/day: 2.00     Years: 35.00     Pack years: 70.00     Quit date: 2001     Years since quittin.3    Smokeless tobacco: Never Used   Substance and Sexual Activity    Alcohol use: No    Drug use: No    Sexual activity: Not Currently   Lifestyle    Physical activity     Days per week: Not on file     Minutes per session: Not on file    Stress: Not on file   Relationships    Social connections     Talks on phone: Not on file     Gets together: Not on file     Attends Scientologist service: Not on file     Active member of club or organization: Not on file     Attends meetings of clubs or organizations: Not on file     Relationship status: Not on file    Intimate partner violence     Fear of current or ex partner: Not on file     Emotionally abused: Not on file     Physically abused: Not on file     Forced sexual activity: Not on file   Other Topics Concern    Not on file   Social History Narrative    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 No distress. ____  HENT:   Head: Normocephalic and atraumatic. ____  Eyes: Conjunctivae are normal. ________  Cardiovascular: Intact distal pulses. ____  Pulmonary/Chest: Effort normal. ________________________  Neurological: She is alert and oriented to person, place, and time. ____  Skin: Skin is dry. She is not diaphoretic. ____  Psychiatric: Mood, affect and judgment normal. ______          Assessment   Vital Signs:      Vitals:    21 0905   BP: (!) 168/97   Pulse: 63   Temp: 97 °F (36.1 °C)       right Knee shows evidence for DJD with no obvious pseudolaxity, pain with weight bearing, antalgic gait and palpable osteophytes. Significant contusion    Inspection: Minimal anterior swelling. Swelling is present with no significant effusion. The posterior aspect of the knee does not appears to be full with no tenderness. There is no erythema, rash, or ecchymosis. Range of Motion:  Right flexion can get to 110 able to perform  extension on his own with no assistance can get to 0. Left 0 to 130 degrees    No with flexion extension testing as well as varus and valgus testing    There is deformity no noted    Strength:  Hamstrings rated: 4/5. Quadriceps rated: 3/5    Palpation: There is minimal tenderness along the quadriceps tendon proximal patellar attachment. Special Tests: Patellar Compression test is positive. Valgus & Varus test is positive. Skin: There are no rashes, ulcerations or lesions. Gait: Gait pattern is antalgic  Skin shows no rashes/ecchymosis to the affected area, no hyperesthesias, no discoloration, no temperature or color discrepancies. NEUROLOGICALLY: There is no evidence for sensory or motor deficits in the extremity. Coordination appears full with no spacticity or rigidity. Reflexes appear to be symmetric. Distal circulation intact. No signs of RSD. Additional Comments: Contusions and swelling of faded        Procedure(s): No new procedure performed at today's date    Diagnostic Test Findings:   Xray   Have reviewed the xrays above from 12/17/20   Three-view x-ray of the right knee AP, lateral and sunrise views were performed on 12/17/2020 and my impression is: No evidence of acute fracture dislocation significant hematoma noted to proximal patellar area and concerns for quadriceps tear    Assessment and Plan:       Diagnosis Orders   1. Acute pain of right knee     2. History of abdominal aortic aneurysm (AAA)                The orders below, if any, were placed during this visit:   No orders of the defined types were placed in this encounter.              Treatment Plan: -Ordered CT scan of the pelvis to help rule out any fracture to the AAA repair from 20 years ago. -Discussed pursuance of gentle mobilization for continued improvement in range of motion and gentle exercises for increased activation and strength of the quadriceps with limited weight across the knee. Advised patient with gentle walking and mobilization as well.  -Discuss potential return to work in the next few weeks with patient significant improvement in his overall function.  -Advised for follow-up in 2 weeks for repeat evaluation to ensure continued progression and reduction in irritation and improvement in strength    -Advised patient to rest ice and elevate as much as possible and continue utilization of Tylenol on an as-needed basis did advise avoidance of NSAIDs to help reduce overall bleeding risk. -Gave patient a note to be out of work until further evaluation can be completed. Have discussed weakness of the leg and functional as well as cosmetic changes.           KELSEY Wakefield

## 2021-01-27 ENCOUNTER — TELEPHONE (OUTPATIENT)
Dept: ORTHOPEDIC SURGERY | Age: 76
End: 2021-01-27

## 2021-01-27 ENCOUNTER — OFFICE VISIT (OUTPATIENT)
Dept: ORTHOPEDIC SURGERY | Age: 76
End: 2021-01-27
Payer: MEDICARE

## 2021-01-27 VITALS
HEART RATE: 62 BPM | BODY MASS INDEX: 25.92 KG/M2 | HEIGHT: 69 IN | WEIGHT: 175 LBS | TEMPERATURE: 96.4 F | DIASTOLIC BLOOD PRESSURE: 86 MMHG | SYSTOLIC BLOOD PRESSURE: 155 MMHG

## 2021-01-27 DIAGNOSIS — M25.561 ACUTE PAIN OF RIGHT KNEE: Primary | ICD-10-CM

## 2021-01-27 PROCEDURE — 1036F TOBACCO NON-USER: CPT | Performed by: ORTHOPAEDIC SURGERY

## 2021-01-27 PROCEDURE — G8484 FLU IMMUNIZE NO ADMIN: HCPCS | Performed by: PHYSICIAN ASSISTANT

## 2021-01-27 PROCEDURE — 1123F ACP DISCUSS/DSCN MKR DOCD: CPT | Performed by: PHYSICIAN ASSISTANT

## 2021-01-27 PROCEDURE — G8417 CALC BMI ABV UP PARAM F/U: HCPCS | Performed by: PHYSICIAN ASSISTANT

## 2021-01-27 PROCEDURE — 3017F COLORECTAL CA SCREEN DOC REV: CPT | Performed by: ORTHOPAEDIC SURGERY

## 2021-01-27 PROCEDURE — G8427 DOCREV CUR MEDS BY ELIG CLIN: HCPCS | Performed by: PHYSICIAN ASSISTANT

## 2021-01-27 PROCEDURE — 4040F PNEUMOC VAC/ADMIN/RCVD: CPT | Performed by: ORTHOPAEDIC SURGERY

## 2021-01-27 PROCEDURE — 99213 OFFICE O/P EST LOW 20 MIN: CPT | Performed by: PHYSICIAN ASSISTANT

## 2021-01-27 NOTE — PROGRESS NOTES
Patient is here for follow-up regarding right quadriceps tear and right knee pain. Patient notes significant improvement in his overall function as well as pain rates his pain is a 0 out of 10 and he is noting increasing control over the knee and reduced instability. He denies buckling give way fall trauma or injury denies numbness burning tingling radiating pains down the leg. Notes that majority of the swelling has decreased significantly and the bruising has almost faded completely. He still notes some mild weakness but notes that this is improving dramatically since 1 month prior. He is very thankful that surgical intervention was not required and is hopeful that he can return to work as a  soon. He is wanting to know what exercises he should be and can be doing on his own to help improve his strength and function. No longer utilizing medications to help with pain is a has very minimal pain at best.      Previously: 12/29/2020  better overall since the previous week. Better ability to extend knee fully. Flexing now to 90 degrees. Swelling decreased with brusing extending down the leg. MRI returned with partial tear of the quad namely the intermedius with retraction and partions of the vastus medialis. Previously: 12/22/2020  Increase in pain over the past several days time. 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 He developed significant pain and swelling in the knee as well as bruising up into the thigh and pelvic area and down into the lower leg. He is noting significant difficulty with his ability to ambulate he did not go to the emergency room but did go to his primary care provider who did x-rays noted no evidence of acute fracture dislocation but with the significance in the swelling and persistent pain advised for him to follow-up with orthopedics. He notes significant difficulty with ability to extend the knee incapable of doing it on his own has to use either his hands or the other foot to push to 1 straight. Utilizing a walker to help with ambulation. Denies numbness burning tingling radiating pains down the leg. Patient also has a history of AAA repair 19 years ago on the right side in the inguinal area as well and he was told by his surgeon that if he ever feels any pressure or discomfort in that groin area that he should get that evaluated immediately. He notes he is feeling that he is not feeling the same symptoms that he was when it started to rupture 19 years ago but is obviously very concerned for this. Medical History  Current Medications:   Prior to Admission medications    Medication Sig Start Date End Date Taking? Authorizing Provider   nitroGLYCERIN (NITROSTAT) 0.3 MG SL tablet Place 1 tablet under the tongue every 5 minutes as needed for Chest pain up to max of 3 total doses.  If no relief after 1 dose, call 911. 10/26/17   Yaquelin Mann MD   Ascorbic Acid (VITAMIN C) 500 MG tablet Take 2,000 mg by mouth daily Indications: Per PT    Historical Provider, MD Flaxseed, Linseed, (FLAX SEED OIL) 1000 MG CAPS Take 1,000 mg by mouth daily    Historical Provider, MD   GARLIC PO Take by mouth    Historical Provider, MD   therapeutic multivitamin-minerals (THERAGRAN-M) tablet Take 1 tablet by mouth daily. Historical Provider, MD   Vitamin D (CHOLECALCIFEROL) 1000 UNITS CAPS capsule Take 1,000 Units by mouth daily. Historical Provider, MD     Allergies: No Known Allergies    Review of Systems:     Review of Systems ______  Constitutional: Negative for fever and diaphoresis. ____________  Respiratory: Negative for shortness of breath.  ________  Gastrointestinal: Negative for abdominal pain. ________  Musculoskeletal: Positive for joint pain. ____  Skin: Negative for itching. ____  Neurological: Negative for loss of consciousness.  ______________  All other systems reviewed and negative     Past Medical History:   Diagnosis Date    BCC (basal cell carcinoma)RT medial periorbital region     CAD (coronary artery disease)     arrested after AAA repair    Cellulitis and abscess of leg 7/18/2012    Cellulitis of hand 12/13/2010    Hyperlipidemia     Hypertension     MRSA infection 7/18/2012    LLE     Family History   Problem Relation Age of Onset    Stroke Mother     Cancer Father         stomach    Heart Disease Father     High Cholesterol Sister     Cancer Sister         pancreatic    Heart Disease Brother         bypass X2    Heart Disease Brother     Arthritis Sister         SLE     Social History     Socioeconomic History    Marital status: Single     Spouse name: Not on file    Number of children: Not on file    Years of education: Not on file    Highest education level: Not on file   Occupational History    Occupation:    Social Needs    Financial resource strain: Not on file    Food insecurity     Worry: Not on file     Inability: Not on file    Transportation needs     Medical: Not on file     Non-medical: Not on file   Tobacco Use  Smoking status: Former Smoker     Packs/day: 2.00     Years: 35.00     Pack years: 70.00     Quit date: 2001     Years since quittin.4    Smokeless tobacco: Never Used   Substance and Sexual Activity    Alcohol use: No    Drug use: No    Sexual activity: Not Currently   Lifestyle    Physical activity     Days per week: Not on file     Minutes per session: Not on file    Stress: Not on file   Relationships    Social connections     Talks on phone: Not on file     Gets together: Not on file     Attends Shinto service: Not on file     Active member of club or organization: Not on file     Attends meetings of clubs or organizations: Not on file     Relationship status: Not on file    Intimate partner violence     Fear of current or ex partner: Not on file     Emotionally abused: Not on file     Physically abused: Not on file     Forced sexual activity: Not on file   Other Topics Concern    Not on file   Social History Narrative    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 No distress. ____  HENT:   Head: Normocephalic and atraumatic. ____  Eyes: Conjunctivae are normal. ________  Cardiovascular: Intact distal pulses. ____  Pulmonary/Chest: Effort normal. ________________________  Neurological: She is alert and oriented to person, place, and time. ____  Skin: Skin is dry. She is not diaphoretic. ____  Psychiatric: Mood, affect and judgment normal. ______          Assessment   Vital Signs:      Vitals:    21 0908   BP: (!) 168/90   Pulse: 62   Temp: 96.4 °F (35.8 °C)       right Knee shows evidence for DJD with no obvious pseudolaxity, pain with weight bearing, antalgic gait and palpable osteophytes. Significant contusion    Inspection: No anterior swelling. No swelling is present with no significant effusion. The posterior aspect of the knee does not appears to be full with no tenderness. There is no erythema, rash, or ecchymosis. Range of Motion:  Right flexion can get to 130 able to perform  extension on his own with no assistance can get to 0. Left 0 to 130 degrees    No pain with flexion extension testing as well as varus and valgus testing    There is no deformity  noted    Strength:  Hamstrings rated: 5/5. Quadriceps rated: 5/5    Palpation: There is minimal tenderness along the quadriceps tendon proximal patellar attachment. Reduced since previous visit    Special Tests: Patellar Compression test is negative. Valgus & Varus test is negative. Skin: There are no rashes, ulcerations or lesions. Gait: Gait pattern is antalgic  Skin shows no rashes/ecchymosis to the affected area, no hyperesthesias, no discoloration, no temperature or color discrepancies. NEUROLOGICALLY: There is no evidence for sensory or motor deficits in the extremity. Coordination appears full with no spacticity or rigidity. Reflexes appear to be symmetric. Distal circulation intact. No signs of RSD. Additional Comments: Contusions and swelling of dissipated completely        Procedure(s): No new procedure performed at today's date    Diagnostic Test Findings:   Xray   Have reviewed the xrays above from 12/17/20   Three-view x-ray of the right knee AP, lateral and sunrise views were performed on 12/17/2020 and my impression is: No evidence of acute fracture dislocation significant hematoma noted to proximal patellar area and concerns for quadriceps tear    Assessment and Plan:       Diagnosis Orders   1. Acute pain of right knee                The orders below, if any, were placed during this visit:   No orders of the defined types were placed in this encounter.              Treatment Plan: -Ordered CT scan of the pelvis to help rule out any fracture to the AAA repair from 20 years ago. -Discussed pursuance of gentle mobilization for continued improvement in range of motion and gentle exercises for increased activation and strength of the quadriceps with limited weight across the knee. Advised patient with gentle walking and mobilization as well. -Gave patient note to return to work on 1/31/2021 full duty  -Advised for follow-up as needed. -Advised patient to rest ice and elevate as much as possible and continue utilization of Tylenol on an as-needed basis did advise avoidance of NSAIDs to help reduce overall bleeding risk. -Gave patient a note to be out of work until further evaluation can be completed. Have discussed weakness of the leg and functional as well as cosmetic changes.           KELSEY Turk

## 2021-05-25 ENCOUNTER — TELEPHONE (OUTPATIENT)
Dept: EMERGENCY DEPT | Age: 76
End: 2021-05-25

## 2021-05-25 NOTE — TELEPHONE ENCOUNTER
[employer] contacted patient by phone to support with scheduling COVID-19 vaccination appointment.  Patient is not interested in scheduling an appointment at this time as they have already received  J&J

## 2023-10-26 ENCOUNTER — TELEPHONE (OUTPATIENT)
Dept: FAMILY MEDICINE CLINIC | Age: 78
End: 2023-10-26

## 2023-10-26 NOTE — TELEPHONE ENCOUNTER
Pt has not been seen by PCP in three years. His sister is calling because pt is not being compliant in care he needs, states he needs a stent and has not had blood work in a while. Pt's current doctor will not see him until pt get his bed bug issue in check, but pt is in denial about that being an issue. Pt's sister and POA thinks that pt might listen to . I told her I would send back a message and go form there.

## 2023-10-27 NOTE — TELEPHONE ENCOUNTER
I called patient's sister Gloria Cavanaugh back and left her a message. We have run into an issue of not having updated HIPPA or POA information from [place], so we can not discuss he medical information until that is taken care of. Dr. Johnson Da Silva did say that the patient needs to be seen by someone, but he is not available until almost the end of November and the patient would need to take care of his bed bug issue before coming into the office.

## 2023-10-30 NOTE — TELEPHONE ENCOUNTER
Pt sister called back and was advised that we were unable to discuss pt with her as of HIPPA form in chart

## 2023-12-06 ENCOUNTER — TELEMEDICINE (OUTPATIENT)
Dept: FAMILY MEDICINE CLINIC | Age: 78
End: 2023-12-06
Payer: MEDICARE

## 2023-12-06 DIAGNOSIS — D69.6 THROMBOCYTOPENIA (HCC): ICD-10-CM

## 2023-12-06 DIAGNOSIS — I25.10 CORONARY ARTERY DISEASE INVOLVING NATIVE CORONARY ARTERY OF NATIVE HEART WITHOUT ANGINA PECTORIS: Primary | ICD-10-CM

## 2023-12-06 PROCEDURE — 99442 PR PHYS/QHP TELEPHONE EVALUATION 11-20 MIN: CPT | Performed by: FAMILY MEDICINE

## 2023-12-06 RX ORDER — ASPIRIN 81 MG/1
81 TABLET, CHEWABLE ORAL DAILY
COMMUNITY

## 2023-12-06 RX ORDER — VITAMIN B COMPLEX
1 CAPSULE ORAL DAILY
COMMUNITY

## 2023-12-06 RX ORDER — ZINC GLUCONATE 50 MG
50 TABLET ORAL DAILY
COMMUNITY

## 2023-12-06 RX ORDER — NIACIN 500 MG
1500 TABLET ORAL
COMMUNITY

## 2023-12-06 RX ORDER — FERROUS SULFATE 325(65) MG
325 TABLET ORAL
COMMUNITY

## 2023-12-06 SDOH — ECONOMIC STABILITY: INCOME INSECURITY: HOW HARD IS IT FOR YOU TO PAY FOR THE VERY BASICS LIKE FOOD, HOUSING, MEDICAL CARE, AND HEATING?: NOT HARD AT ALL

## 2023-12-06 SDOH — ECONOMIC STABILITY: HOUSING INSECURITY
IN THE LAST 12 MONTHS, WAS THERE A TIME WHEN YOU DID NOT HAVE A STEADY PLACE TO SLEEP OR SLEPT IN A SHELTER (INCLUDING NOW)?: NO

## 2023-12-06 SDOH — ECONOMIC STABILITY: FOOD INSECURITY: WITHIN THE PAST 12 MONTHS, YOU WORRIED THAT YOUR FOOD WOULD RUN OUT BEFORE YOU GOT MONEY TO BUY MORE.: NEVER TRUE

## 2023-12-06 SDOH — ECONOMIC STABILITY: FOOD INSECURITY: WITHIN THE PAST 12 MONTHS, THE FOOD YOU BOUGHT JUST DIDN'T LAST AND YOU DIDN'T HAVE MONEY TO GET MORE.: NEVER TRUE

## 2023-12-06 ASSESSMENT — ENCOUNTER SYMPTOMS
VISUAL CHANGE: 0
COUGH: 0
VOMITING: 0
NAUSEA: 0
ABDOMINAL PAIN: 0
SWOLLEN GLANDS: 0
SORE THROAT: 0
CHANGE IN BOWEL HABIT: 0

## 2023-12-06 ASSESSMENT — PATIENT HEALTH QUESTIONNAIRE - PHQ9
SUM OF ALL RESPONSES TO PHQ QUESTIONS 1-9: 0
SUM OF ALL RESPONSES TO PHQ9 QUESTIONS 1 & 2: 0
SUM OF ALL RESPONSES TO PHQ QUESTIONS 1-9: 0
SUM OF ALL RESPONSES TO PHQ QUESTIONS 1-9: 0
1. LITTLE INTEREST OR PLEASURE IN DOING THINGS: 0
SUM OF ALL RESPONSES TO PHQ QUESTIONS 1-9: 0
2. FEELING DOWN, DEPRESSED OR HOPELESS: 0

## 2023-12-06 NOTE — PROGRESS NOTES
Subjective:     Patient ID:Lewis Camilo is a 70 y.o. male. Heart Problem  This is a new problem. The current episode started more than 1 month ago. The problem occurs rarely. The problem has been rapidly improving. Pertinent negatives include no abdominal pain, anorexia, arthralgias, change in bowel habit, chest pain, chills, congestion, coughing, diaphoresis, fatigue, fever, headaches, joint swelling, myalgias, nausea, neck pain, numbness, rash, sore throat, swollen glands, urinary symptoms, vertigo, visual change, vomiting or weakness. Nothing aggravates the symptoms. He has tried nothing (getting STENT soon--noted to have dec platelets and saw hem(?myelodysplasia)) for the symptoms.  The treatment provided moderate relief.   -visit went on for 20 min    No Known Allergies    Current Outpatient Medications   Medication Sig Dispense Refill    niacin 500 MG tablet Take 3 tablets by mouth daily (with breakfast)      ferrous sulfate (IRON 325) 325 (65 Fe) MG tablet Take 1 tablet by mouth daily (with breakfast)      zinc gluconate 50 MG tablet Take 1 tablet by mouth daily      Nutritional Supplements (GLUCOSAMINE COMPLEX PO) Take by mouth      b complex vitamins capsule Take 1 capsule by mouth daily      Omega-3 Fatty Acids (FISH OIL PO) Take by mouth      TURMERIC CURCUMIN PO Take by mouth      aspirin 81 MG chewable tablet Take 1 tablet by mouth daily      Misc Natural Products (PROSTATE SUPPORT PO) Take by mouth      GRAPE SEED EXTRACT PO Take by mouth      Menaquinone-7 (K2 PO) Take by mouth      Coenzyme Q10 (CO Q 10 PO) Take by mouth      NONFORMULARY Vision max      CINNAMON PO Take by mouth      ECHINACEA PO Take by mouth      Ascorbic Acid (VITAMIN C) 500 MG tablet Take 4 tablets by mouth daily Indications: Per PT      Flaxseed, Linseed, (FLAX SEED OIL) 1000 MG CAPS Take 1,000 mg by mouth daily      GARLIC PO Take by mouth      therapeutic multivitamin-minerals (THERAGRAN-M) tablet Take 1 tablet

## 2024-08-19 ENCOUNTER — APPOINTMENT (OUTPATIENT)
Dept: CT IMAGING | Age: 79
DRG: 695 | End: 2024-08-19
Payer: MEDICARE

## 2024-08-19 ENCOUNTER — HOSPITAL ENCOUNTER (INPATIENT)
Age: 79
LOS: 2 days | Discharge: HOME | DRG: 695 | End: 2024-08-21
Attending: EMERGENCY MEDICINE | Admitting: STUDENT IN AN ORGANIZED HEALTH CARE EDUCATION/TRAINING PROGRAM
Payer: MEDICARE

## 2024-08-19 ENCOUNTER — APPOINTMENT (OUTPATIENT)
Dept: GENERAL RADIOLOGY | Age: 79
DRG: 695 | End: 2024-08-19
Payer: MEDICARE

## 2024-08-19 DIAGNOSIS — R30.0 DYSURIA: Primary | ICD-10-CM

## 2024-08-19 DIAGNOSIS — R31.9 HEMATURIA, UNSPECIFIED TYPE: ICD-10-CM

## 2024-08-19 DIAGNOSIS — R07.9 CHEST PAIN, UNSPECIFIED TYPE: ICD-10-CM

## 2024-08-19 DIAGNOSIS — I21.4 NSTEMI (NON-ST ELEVATED MYOCARDIAL INFARCTION) (HCC): ICD-10-CM

## 2024-08-19 LAB
ALBUMIN SERPL-MCNC: 4.3 G/DL (ref 3.4–5)
ALBUMIN/GLOB SERPL: 1.2 {RATIO} (ref 1.1–2.2)
ALP SERPL-CCNC: 90 U/L (ref 40–129)
ALT SERPL-CCNC: 15 U/L (ref 10–40)
ANION GAP SERPL CALCULATED.3IONS-SCNC: 12 MMOL/L (ref 3–16)
AST SERPL-CCNC: 24 U/L (ref 15–37)
BASE EXCESS BLDV CALC-SCNC: 2.5 MMOL/L (ref -2–3)
BASOPHILS # BLD: 0 K/UL (ref 0–0.2)
BASOPHILS NFR BLD: 0.4 %
BILIRUB SERPL-MCNC: 0.5 MG/DL (ref 0–1)
BILIRUB UR QL STRIP.AUTO: ABNORMAL
BUN SERPL-MCNC: 12 MG/DL (ref 7–20)
CALCIUM SERPL-MCNC: 9 MG/DL (ref 8.3–10.6)
CHLORIDE SERPL-SCNC: 97 MMOL/L (ref 99–110)
CLARITY UR: ABNORMAL
CO2 BLDV-SCNC: 30 MMOL/L
CO2 SERPL-SCNC: 27 MMOL/L (ref 21–32)
COHGB MFR BLDV: 1 % (ref 0–1.5)
COLOR UR: ABNORMAL
CREAT SERPL-MCNC: 0.7 MG/DL (ref 0.8–1.3)
DEPRECATED RDW RBC AUTO: 13.9 % (ref 12.4–15.4)
DO-HGB MFR BLDV: 63.4 %
EKG ATRIAL RATE: 80 BPM
EKG ATRIAL RATE: 81 BPM
EKG DIAGNOSIS: NORMAL
EKG DIAGNOSIS: NORMAL
EKG P AXIS: 35 DEGREES
EKG P AXIS: 70 DEGREES
EKG P-R INTERVAL: 156 MS
EKG P-R INTERVAL: 160 MS
EKG Q-T INTERVAL: 370 MS
EKG Q-T INTERVAL: 398 MS
EKG QRS DURATION: 106 MS
EKG QRS DURATION: 98 MS
EKG QTC CALCULATION (BAZETT): 429 MS
EKG QTC CALCULATION (BAZETT): 459 MS
EKG R AXIS: -13 DEGREES
EKG R AXIS: -54 DEGREES
EKG T AXIS: -25 DEGREES
EKG T AXIS: -46 DEGREES
EKG VENTRICULAR RATE: 80 BPM
EKG VENTRICULAR RATE: 81 BPM
EOSINOPHIL # BLD: 0.1 K/UL (ref 0–0.6)
EOSINOPHIL NFR BLD: 0.7 %
GFR SERPLBLD CREATININE-BSD FMLA CKD-EPI: >90 ML/MIN/{1.73_M2}
GLUCOSE SERPL-MCNC: 160 MG/DL (ref 70–99)
GLUCOSE UR STRIP.AUTO-MCNC: NEGATIVE MG/DL
HCO3 BLDV-SCNC: 28.4 MMOL/L (ref 24–28)
HCT VFR BLD AUTO: 39.2 % (ref 40.5–52.5)
HGB BLD-MCNC: 13 G/DL (ref 13.5–17.5)
HGB UR QL STRIP.AUTO: ABNORMAL
KETONES UR STRIP.AUTO-MCNC: 15 MG/DL
LEUKOCYTE ESTERASE UR QL STRIP.AUTO: ABNORMAL
LIPASE SERPL-CCNC: 28 U/L (ref 13–60)
LYMPHOCYTES # BLD: 0.8 K/UL (ref 1–5.1)
LYMPHOCYTES NFR BLD: 7.8 %
MCH RBC QN AUTO: 31.5 PG (ref 26–34)
MCHC RBC AUTO-ENTMCNC: 33.1 G/DL (ref 31–36)
MCV RBC AUTO: 95.1 FL (ref 80–100)
METHGB MFR BLDV: 0.5 % (ref 0–1.5)
MONOCYTES # BLD: 1.5 K/UL (ref 0–1.3)
MONOCYTES NFR BLD: 15.8 %
NEUTROPHILS # BLD: 7.3 K/UL (ref 1.7–7.7)
NEUTROPHILS NFR BLD: 75.3 %
NITRITE UR QL STRIP.AUTO: POSITIVE
NT-PROBNP SERPL-MCNC: 165 PG/ML (ref 0–449)
PATH INTERP BLD-IMP: NO
PCO2 BLDV: 48.3 MMHG (ref 41–51)
PH BLDV: 7.38 [PH] (ref 7.35–7.45)
PH UR STRIP.AUTO: 7 [PH] (ref 5–8)
PLATELET # BLD AUTO: 57 K/UL (ref 135–450)
PLATELET BLD QL SMEAR: ABNORMAL
PMV BLD AUTO: 10.9 FL (ref 5–10.5)
PO2 BLDV: <30 MMHG (ref 25–40)
POTASSIUM SERPL-SCNC: 4.2 MMOL/L (ref 3.5–5.1)
PROT SERPL-MCNC: 7.8 G/DL (ref 6.4–8.2)
PROT UR STRIP.AUTO-MCNC: >=300 MG/DL
RBC # BLD AUTO: 4.12 M/UL (ref 4.2–5.9)
RBC #/AREA URNS HPF: >100 /HPF (ref 0–4)
SAO2 % BLDV: 36 %
SLIDE REVIEW: ABNORMAL
SODIUM SERPL-SCNC: 136 MMOL/L (ref 136–145)
SP GR UR STRIP.AUTO: >=1.03 (ref 1–1.03)
TROPONIN, HIGH SENSITIVITY: 149 NG/L (ref 0–22)
TROPONIN, HIGH SENSITIVITY: 32 NG/L (ref 0–22)
TROPONIN, HIGH SENSITIVITY: 73 NG/L (ref 0–22)
UA COMPLETE W REFLEX CULTURE PNL UR: ABNORMAL
UA DIPSTICK W REFLEX MICRO PNL UR: YES
URN SPEC COLLECT METH UR: ABNORMAL
UROBILINOGEN UR STRIP-ACNC: 2 E.U./DL
WBC # BLD AUTO: 9.7 K/UL (ref 4–11)
WBC #/AREA URNS HPF: ABNORMAL /HPF (ref 0–5)

## 2024-08-19 PROCEDURE — 83036 HEMOGLOBIN GLYCOSYLATED A1C: CPT

## 2024-08-19 PROCEDURE — 2580000003 HC RX 258: Performed by: STUDENT IN AN ORGANIZED HEALTH CARE EDUCATION/TRAINING PROGRAM

## 2024-08-19 PROCEDURE — 6370000000 HC RX 637 (ALT 250 FOR IP): Performed by: STUDENT IN AN ORGANIZED HEALTH CARE EDUCATION/TRAINING PROGRAM

## 2024-08-19 PROCEDURE — 6370000000 HC RX 637 (ALT 250 FOR IP): Performed by: INTERNAL MEDICINE

## 2024-08-19 PROCEDURE — 51700 IRRIGATION OF BLADDER: CPT

## 2024-08-19 PROCEDURE — 82803 BLOOD GASES ANY COMBINATION: CPT

## 2024-08-19 PROCEDURE — 2060000000 HC ICU INTERMEDIATE R&B

## 2024-08-19 PROCEDURE — 6370000000 HC RX 637 (ALT 250 FOR IP): Performed by: EMERGENCY MEDICINE

## 2024-08-19 PROCEDURE — 84484 ASSAY OF TROPONIN QUANT: CPT

## 2024-08-19 PROCEDURE — 99285 EMERGENCY DEPT VISIT HI MDM: CPT

## 2024-08-19 PROCEDURE — 51702 INSERT TEMP BLADDER CATH: CPT

## 2024-08-19 PROCEDURE — 83690 ASSAY OF LIPASE: CPT

## 2024-08-19 PROCEDURE — 80061 LIPID PANEL: CPT

## 2024-08-19 PROCEDURE — 81001 URINALYSIS AUTO W/SCOPE: CPT

## 2024-08-19 PROCEDURE — 2580000003 HC RX 258: Performed by: EMERGENCY MEDICINE

## 2024-08-19 PROCEDURE — 99223 1ST HOSP IP/OBS HIGH 75: CPT | Performed by: INTERNAL MEDICINE

## 2024-08-19 PROCEDURE — 85025 COMPLETE CBC W/AUTO DIFF WBC: CPT

## 2024-08-19 PROCEDURE — 71275 CT ANGIOGRAPHY CHEST: CPT

## 2024-08-19 PROCEDURE — 71046 X-RAY EXAM CHEST 2 VIEWS: CPT

## 2024-08-19 PROCEDURE — 74177 CT ABD & PELVIS W/CONTRAST: CPT

## 2024-08-19 PROCEDURE — 6360000002 HC RX W HCPCS: Performed by: EMERGENCY MEDICINE

## 2024-08-19 PROCEDURE — 36415 COLL VENOUS BLD VENIPUNCTURE: CPT

## 2024-08-19 PROCEDURE — 6360000004 HC RX CONTRAST MEDICATION: Performed by: EMERGENCY MEDICINE

## 2024-08-19 PROCEDURE — 96374 THER/PROPH/DIAG INJ IV PUSH: CPT

## 2024-08-19 PROCEDURE — 93005 ELECTROCARDIOGRAM TRACING: CPT | Performed by: EMERGENCY MEDICINE

## 2024-08-19 PROCEDURE — 80053 COMPREHEN METABOLIC PANEL: CPT

## 2024-08-19 PROCEDURE — 83880 ASSAY OF NATRIURETIC PEPTIDE: CPT

## 2024-08-19 PROCEDURE — 6360000002 HC RX W HCPCS: Performed by: STUDENT IN AN ORGANIZED HEALTH CARE EDUCATION/TRAINING PROGRAM

## 2024-08-19 RX ORDER — HYDROMORPHONE HYDROCHLORIDE 1 MG/ML
0.5 INJECTION, SOLUTION INTRAMUSCULAR; INTRAVENOUS; SUBCUTANEOUS ONCE
Status: DISCONTINUED | OUTPATIENT
Start: 2024-08-19 | End: 2024-08-21 | Stop reason: HOSPADM

## 2024-08-19 RX ORDER — POLYETHYLENE GLYCOL 3350 17 G/17G
17 POWDER, FOR SOLUTION ORAL DAILY PRN
Status: DISCONTINUED | OUTPATIENT
Start: 2024-08-19 | End: 2024-08-21 | Stop reason: HOSPADM

## 2024-08-19 RX ORDER — ATORVASTATIN CALCIUM 40 MG/1
40 TABLET, FILM COATED ORAL NIGHTLY
Status: DISCONTINUED | OUTPATIENT
Start: 2024-08-19 | End: 2024-08-21 | Stop reason: HOSPADM

## 2024-08-19 RX ORDER — SODIUM CHLORIDE 0.9 % (FLUSH) 0.9 %
5-40 SYRINGE (ML) INJECTION PRN
Status: DISCONTINUED | OUTPATIENT
Start: 2024-08-19 | End: 2024-08-21 | Stop reason: HOSPADM

## 2024-08-19 RX ORDER — NITROGLYCERIN 0.4 MG/1
0.4 TABLET SUBLINGUAL EVERY 5 MIN PRN
Status: DISCONTINUED | OUTPATIENT
Start: 2024-08-19 | End: 2024-08-19

## 2024-08-19 RX ORDER — ASPIRIN 325 MG
325 TABLET ORAL ONCE
Status: COMPLETED | OUTPATIENT
Start: 2024-08-19 | End: 2024-08-19

## 2024-08-19 RX ORDER — NITROGLYCERIN 0.4 MG/1
0.4 TABLET SUBLINGUAL EVERY 5 MIN PRN
Status: DISCONTINUED | OUTPATIENT
Start: 2024-08-19 | End: 2024-08-21 | Stop reason: HOSPADM

## 2024-08-19 RX ORDER — ASPIRIN 81 MG/1
81 TABLET, CHEWABLE ORAL DAILY
Status: DISCONTINUED | OUTPATIENT
Start: 2024-08-20 | End: 2024-08-21 | Stop reason: HOSPADM

## 2024-08-19 RX ORDER — SODIUM CHLORIDE 9 MG/ML
INJECTION, SOLUTION INTRAVENOUS PRN
Status: DISCONTINUED | OUTPATIENT
Start: 2024-08-19 | End: 2024-08-21 | Stop reason: HOSPADM

## 2024-08-19 RX ORDER — ENOXAPARIN SODIUM 100 MG/ML
40 INJECTION SUBCUTANEOUS DAILY
Status: DISCONTINUED | OUTPATIENT
Start: 2024-08-19 | End: 2024-08-21 | Stop reason: HOSPADM

## 2024-08-19 RX ORDER — CLOPIDOGREL BISULFATE 75 MG/1
75 TABLET ORAL DAILY
COMMUNITY

## 2024-08-19 RX ORDER — ONDANSETRON 4 MG/1
4 TABLET, ORALLY DISINTEGRATING ORAL EVERY 8 HOURS PRN
Status: DISCONTINUED | OUTPATIENT
Start: 2024-08-19 | End: 2024-08-21 | Stop reason: HOSPADM

## 2024-08-19 RX ORDER — METOPROLOL SUCCINATE 25 MG/1
25 TABLET, EXTENDED RELEASE ORAL DAILY
Status: DISCONTINUED | OUTPATIENT
Start: 2024-08-19 | End: 2024-08-21 | Stop reason: HOSPADM

## 2024-08-19 RX ORDER — ONDANSETRON 2 MG/ML
4 INJECTION INTRAMUSCULAR; INTRAVENOUS EVERY 6 HOURS PRN
Status: DISCONTINUED | OUTPATIENT
Start: 2024-08-19 | End: 2024-08-21 | Stop reason: HOSPADM

## 2024-08-19 RX ORDER — ATORVASTATIN CALCIUM 80 MG/1
80 TABLET, FILM COATED ORAL DAILY
COMMUNITY

## 2024-08-19 RX ORDER — ACETAMINOPHEN 650 MG/1
650 SUPPOSITORY RECTAL EVERY 6 HOURS PRN
Status: DISCONTINUED | OUTPATIENT
Start: 2024-08-19 | End: 2024-08-21 | Stop reason: HOSPADM

## 2024-08-19 RX ORDER — OXYCODONE HYDROCHLORIDE 5 MG/1
5 TABLET ORAL EVERY 6 HOURS PRN
Status: DISCONTINUED | OUTPATIENT
Start: 2024-08-19 | End: 2024-08-21 | Stop reason: HOSPADM

## 2024-08-19 RX ORDER — ACETAMINOPHEN 325 MG/1
650 TABLET ORAL EVERY 6 HOURS PRN
Status: DISCONTINUED | OUTPATIENT
Start: 2024-08-19 | End: 2024-08-21 | Stop reason: HOSPADM

## 2024-08-19 RX ORDER — SODIUM CHLORIDE 0.9 % (FLUSH) 0.9 %
5-40 SYRINGE (ML) INJECTION EVERY 12 HOURS SCHEDULED
Status: DISCONTINUED | OUTPATIENT
Start: 2024-08-19 | End: 2024-08-21 | Stop reason: HOSPADM

## 2024-08-19 RX ORDER — IOPAMIDOL 755 MG/ML
75 INJECTION, SOLUTION INTRAVASCULAR
Status: COMPLETED | OUTPATIENT
Start: 2024-08-19 | End: 2024-08-19

## 2024-08-19 RX ADMIN — ENOXAPARIN SODIUM 40 MG: 100 INJECTION SUBCUTANEOUS at 12:45

## 2024-08-19 RX ADMIN — NITROGLYCERIN 0.4 MG: 0.4 TABLET, ORALLY DISINTEGRATING SUBLINGUAL at 07:58

## 2024-08-19 RX ADMIN — ASPIRIN 325 MG: 325 TABLET ORAL at 07:58

## 2024-08-19 RX ADMIN — NITROGLYCERIN 0.5 INCH: 20 OINTMENT TOPICAL at 21:14

## 2024-08-19 RX ADMIN — SODIUM CHLORIDE, PRESERVATIVE FREE 10 ML: 5 INJECTION INTRAVENOUS at 12:45

## 2024-08-19 RX ADMIN — IOPAMIDOL 75 ML: 755 INJECTION, SOLUTION INTRAVENOUS at 07:35

## 2024-08-19 RX ADMIN — WATER 2000 MG: 1 INJECTION INTRAMUSCULAR; INTRAVENOUS; SUBCUTANEOUS at 07:57

## 2024-08-19 RX ADMIN — METOPROLOL SUCCINATE 25 MG: 25 TABLET, EXTENDED RELEASE ORAL at 15:05

## 2024-08-19 RX ADMIN — ATORVASTATIN CALCIUM 40 MG: 40 TABLET, FILM COATED ORAL at 21:15

## 2024-08-19 RX ADMIN — SODIUM CHLORIDE, PRESERVATIVE FREE 10 ML: 5 INJECTION INTRAVENOUS at 21:15

## 2024-08-19 RX ADMIN — NITROGLYCERIN 0.5 INCH: 20 OINTMENT TOPICAL at 15:22

## 2024-08-19 ASSESSMENT — PAIN DESCRIPTION - ORIENTATION: ORIENTATION: LEFT

## 2024-08-19 ASSESSMENT — PAIN - FUNCTIONAL ASSESSMENT: PAIN_FUNCTIONAL_ASSESSMENT: 0-10

## 2024-08-19 ASSESSMENT — LIFESTYLE VARIABLES
HOW OFTEN DO YOU HAVE A DRINK CONTAINING ALCOHOL: NEVER
HOW MANY STANDARD DRINKS CONTAINING ALCOHOL DO YOU HAVE ON A TYPICAL DAY: PATIENT DOES NOT DRINK

## 2024-08-19 ASSESSMENT — ENCOUNTER SYMPTOMS
SHORTNESS OF BREATH: 0
ABDOMINAL PAIN: 0
VOMITING: 0
COUGH: 0
WHEEZING: 0
DIARRHEA: 0
NAUSEA: 0
EYE PAIN: 0

## 2024-08-19 ASSESSMENT — PAIN SCALES - GENERAL
PAINLEVEL_OUTOF10: 0
PAINLEVEL_OUTOF10: 4

## 2024-08-19 ASSESSMENT — PAIN DESCRIPTION - PAIN TYPE: TYPE: ACUTE PAIN

## 2024-08-19 ASSESSMENT — PAIN DESCRIPTION - DESCRIPTORS: DESCRIPTORS: HEAVINESS

## 2024-08-19 ASSESSMENT — PAIN DESCRIPTION - LOCATION: LOCATION: ARM;CHEST

## 2024-08-19 NOTE — H&P
V2.0  History and Physical      Name:  Lewis Painter /Age/Sex: 1945  (78 y.o. male)   MRN & CSN:  [id number] & [id number] Encounter Date/Time: 2024 9:39 AM EDT   [facility] PCP: Edu Brice MD       Hospital Day: 1    Assessment and Plan:   Lewis Painter is a 78 y.o. 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  Reports ongoing urinary symptoms for 3 days prior to presentation with dark urine developing on the day of presentation.  Developed an episode of diaphoresis, chest pressure radiating to the left arm while straining to urinate which prompted his visit to the ER.    Patient received nitroglycerin and aspirin in the ER.  He was hemodynamically stable.  Labs showed a blood glucose of 160.  Urinalysis showed hematuria with leukocyte esterase and nitrites.  EKG showed normal sinus rhythm with PACs with T wave inversions in leads 2, 3, aVF. High-sensitivity troponin 32, 73.  CTA chest showed new fusiform aneurysmal dilation of the ascending thoracic aorta up to 4.8 cm.  Fusiform ectasia of ascending thoracic aorta at 4.3 cm. No evidence of dissection. CTA abdomen pelvis showed masslike hyperdensity in the posterior aspect of the urinary bladder concerning clots.  Status post aortic aneurysm repair of the infrarenal aorta at 3.9 cm.    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 Axial images and multiplanar reformatted images are provided for review.  Individualized dose optimization technique was used in order to meet ALARA standards for radiation dose reduction.  In addition to vendor specific dose reduction algorithms, the dose reduction techniques vary based on the specific scanner utilized but frequently include automated exposure control, adjustment of the mA and/or kV according to patient size, and use of iterative reconstruction technique. IV Contrast: 75 mL Isovue-370 Oral Contrast: None. FINDINGS: LUNG BASES: Clear. LIVER: Normal. GALLBLADDER AND BILIARY TREE: 4 mm stone within the dependent gallbladder. Gallbladder nondistended without surrounding inflammatory change. No intra- or extrahepatic biliary dilatation. PANCREAS: Normal. SPLEEN: Normal. ADRENAL GLANDS: Normal. KIDNEYS AND URETERS: 3 mm nonobstructing stone within the midportion of the right kidney. No ureteral calculi or hydronephrosis. 2.4 cm hypodense simple cyst within the lower portion of the right kidney for which no specific follow-up recommended. Previously described 8 mm hypodense lesion within the posterior aspect of the left kidney slightly decreased in size compared with prior study from 2009 consistent with a benign cyst again for which no specific follow-up recommended. No new suspicious cortical lesions. URINARY BLADDER: Masslike hyperdensity within the posterior aspect of the urinary bladder measuring up to 2.6 cm AP by 5.2 cm LR image 89. REPRODUCTIVE ORGANS: Mild prostatomegaly. BOWEL: Normal caliber. Diverticulosis of the colon. No suspicious bowel wall thickening or surrounding inflammatory fat stranding. LYMPH NODES: No abnormally enlarged nodes. PERITONEUM/RETROPERITONEUM: No ascites or free air. VESSELS: Prior abdominal aorta repair again noted, with slightly more prominent

## 2024-08-19 NOTE — ED PROVIDER NOTES
THE [facility]  EMERGENCY DEPARTMENT ENCOUNTER          ATTENDING PHYSICIAN NOTE       Date of evaluation: 8/19/2024    Chief Complaint     Dysuria, Hematuria, Chest Pain, and Arm Pain      History of Present Illness     Lewis Painter is a 78 y.o. male who presents with chief complaint of difficulty with urination, hematuria, chest pain and arm pain.  Has a past medical history significant for abdominal aortic aneurysm status postrepair.  CAD status post three-vessel CABG and PCI.  He is on Plavix.  Has longstanding thrombocytopenia for which he follows with hematology at [facility].  He states that over the course the past several days he has had increased difficulty initiating his urine stream with weaker than usual urine stream and having to strain to get his urine out.  Has been having dark red hematuria with some intermittent clots.  He states this evening he developed some diaphoresis and chest pressure which radiated into his left arm and that is primarily what concerned him to have him come to the emergency department this evening.  He states he had a large urine void immediately prior to coming here and actually feels like he got most of his urine out though there was still blood in it.    ASSESSMENT / PLAN  (MEDICAL DECISION MAKING)     INITIAL VITALS: BP: 121/82, Temp: 97.8 °F (36.6 °C),  , Respirations: 18, SpO2: 98 %        Medical Decision Making  Problems Addressed:  Chest pain, unspecified type: complicated acute illness or injury     Details: EKG without convincing evidence of acute ischemia.  Cardiac enzymes are pending.  Chest x-ray ordered pending.  Does have strong CAD history.  Further management will be dictated by his laboratory studies and chest x-ray results.  Dysuria: acute illness or injury  Hematuria, unspecified type: acute illness or injury     Details: Is on Plavix.  Also having some difficulty initiating urine stream.  Unclear if this is potentially a hemorrhagic  reports that he quit smoking about 22 years ago. He started smoking about 58 years ago. He has a 70 pack-year smoking history. He has never used smokeless tobacco. He reports that he does not drink alcohol and does not use drugs.    Medications     Previous Medications    ASCORBIC ACID (VITAMIN C) 500 MG TABLET    Take 4 tablets by mouth daily Indications: Per PT    ASPIRIN 81 MG CHEWABLE TABLET    Take 1 tablet by mouth daily    B COMPLEX VITAMINS CAPSULE    Take 1 capsule by mouth daily    CINNAMON PO    Take by mouth    COENZYME Q10 (CO Q 10 PO)    Take by mouth    ECHINACEA PO    Take by mouth    FERROUS SULFATE (IRON 325) 325 (65 FE) MG TABLET    Take 1 tablet by mouth daily (with breakfast)    FLAXSEED, LINSEED, (FLAX SEED OIL) 1000 MG CAPS    Take 1,000 mg by mouth daily    GARLIC PO    Take by mouth    GRAPE SEED EXTRACT PO    Take by mouth    MENAQUINONE-7 (K2 PO)    Take by mouth    MISC NATURAL PRODUCTS (PROSTATE SUPPORT PO)    Take by mouth    NIACIN 500 MG TABLET    Take 3 tablets by mouth daily (with breakfast)    NONFORMULARY    Vision max    NUTRITIONAL SUPPLEMENTS (GLUCOSAMINE COMPLEX PO)    Take by mouth    OMEGA-3 FATTY ACIDS (FISH OIL PO)    Take by mouth    THERAPEUTIC MULTIVITAMIN-MINERALS (THERAGRAN-M) TABLET    Take 1 tablet by mouth daily    TURMERIC CURCUMIN PO    Take by mouth    VITAMIN D (CHOLECALCIFEROL) 1000 UNITS CAPS CAPSULE    Take 1 capsule by mouth daily    ZINC GLUCONATE 50 MG TABLET    Take 1 tablet by mouth daily       Allergies     He has No Known Allergies.    Physical Exam     INITIAL VITALS: BP: 121/82, Temp: 97.8 °F (36.6 °C),  , Respirations: 18, SpO2: 98 %   Physical Exam  Constitutional:       General: He is not in acute distress.     Appearance: He is well-developed.   HENT:      Head: Normocephalic and atraumatic.   Neck:      Vascular: No JVD.   Cardiovascular:      Rate and Rhythm: Normal rate and regular rhythm.      Heart sounds: Normal heart sounds. No murmur

## 2024-08-19 NOTE — PROGRESS NOTES
Attempted to place 3 way 22 Fr martin for continuous irrigation as ordered. 22Fr met resistance. 18 Fr 3 way martin placed.     Indwelling urinary catheter was placed, per orders, using sterile technique following hospital policy. WON Rodriguez, observed with procedure to ensure proper technique. This catheter was placed on 4 PCU.

## 2024-08-19 NOTE — PLAN OF CARE
Problem: Discharge Planning  Goal: Discharge to home or other facility with appropriate resources  Outcome: Progressing  Flowsheets (Taken 8/19/2024 1800)  Discharge to home or other facility with appropriate resources: Identify barriers to discharge with patient and caregiver     Problem: Safety - Adult  Goal: Free from fall injury  Outcome: Progressing  Flowsheets (Taken 8/19/2024 1800)  Free From Fall Injury: Instruct family/caregiver on patient safety     Problem: Pain  Goal: Verbalizes/displays adequate comfort level or baseline comfort level  Outcome: Progressing  Flowsheets (Taken 8/19/2024 1800)  Verbalizes/displays adequate comfort level or baseline comfort level:   Encourage patient to monitor pain and request assistance   Assess pain using appropriate pain scale

## 2024-08-19 NOTE — PLAN OF CARE
Consult received. 78 yoM on plavix for history of AAA repair and CAD with PCI. Has been having hematuria for a few days. Was able to void prior to ER visit. CT scan revealing bladder mass vs clot. Reviewed with MD. Ordered PVR which was 380. Our recommendation is to place 3 way catheter with CBI irrigation started. Discussed with nurse plan. We will formally see tomorrow for consult.

## 2024-08-19 NOTE — CONSULTS
Pharmacy Consult Note  - Admission Medication Reconciliation      Pharmacy consulted for reconciliation of jioxp-rq-rnszraydd medications.        Sources include:   Review of Rx dispense history (Surescripts-complete dispense report in Epic)   Discussion with patient    The following changes made to levac-te-fszpipggx medication list:    ADDED:  1) Plavix 75 mg daily  2) Atorvastatin 80 mg daily     Dose or Frequency CHANGE:  none    REMOVED:  1) ASA  2) Niacin      Current Outpatient Medications   Medication Instructions    atorvastatin (LIPITOR) 80 mg, Oral, DAILY    b complex vitamins capsule 1 capsule, Oral, DAILY    CINNAMON PO 2 capsules, Oral, DAILY    clopidogrel (PLAVIX) 75 mg, Oral, DAILY    Coenzyme Q10 (CO Q 10 PO) 1 tablet, Oral, DAILY    ECHINACEA PO Oral    ferrous sulfate (IRON 325) 325 mg, Oral, DAILY WITH BREAKFAST    Flax Seed Oil 1,000 mg, Oral, DAILY    GARLIC PO 1 capsule, Oral, DAILY    GRAPE SEED EXTRACT PO 1 tablet, Oral, DAILY    Menaquinone-7 (K2 PO) 1 tablet, Oral, DAILY    Misc Natural Products (PROSTATE SUPPORT PO) 1 tablet, Oral, DAILY    NONFORMULARY 1 tablet, Oral, DAILY, Vision max    Nutritional Supplements (GLUCOSAMINE COMPLEX PO) Oral    Omega-3 Fatty Acids (FISH OIL PO) 1 capsule, Oral, DAILY    therapeutic multivitamin-minerals (THERAGRAN-M) tablet 1 tablet, Oral, DAILY    TURMERIC CURCUMIN PO 2 capsules, Oral, DAILY    vitamin C (ASCORBIC ACID) 2,000 mg, Oral, DAILY    Vitamin D (CHOLECALCIFEROL) 1,000 Units, Oral, DAILY    zinc gluconate 50 mg, Oral, DAILY       Thank you for the consult-   Please call with any questions    Lesa Norris RPh PharmD, [facility]   Inpatient pharmacy 5-2022

## 2024-08-19 NOTE — PROGRESS NOTES
Jewel Weldon paged d/t patient walking around room yelling in pain d/t continous bladder irrigation clotted off, new orders for pain meds, SEE MAR, ended up getting CBI unclogged, pt not needing pain meds now, will continue to monitor.

## 2024-08-19 NOTE — CONSULTS
[facility]  CARDIOLOGY  CONSULTATION         Reason for Consultation/Chief Complaint: \"I have been having chest pain.\"       History of Present Illness:  Lewis Painter is a 78 y.o. patient who presented to the hospital with complaints of chest pain after straining to urinate.  Passed clots in urine and trop 39 to 73 to 149.  Had left main to left Cx intervention.  Sees Dr. Johnson from  Cardiology.  Pt has complex coronary anatomy and may be best treated in a surgical backup hospital if cath is ultimately needed.    Past Medical History:   has a past medical history of BCC (basal cell carcinoma)RT medial periorbital region, CAD (coronary artery disease), Cellulitis and abscess of leg, Cellulitis of hand, Hyperlipidemia, Hypertension, and MRSA infection.    Surgical History:   has a past surgical history that includes Abdominal aortic aneurysm repair (01); Coronary artery bypass graft (01); and Leg Debridement (07/19/2012).     Social History:   reports that he quit smoking about 22 years ago. He started smoking about 58 years ago. He has a 70 pack-year smoking history. He has never used smokeless tobacco. He reports that he does not drink alcohol and does not use drugs.     Family History:  No evidence for sudden cardiac death or premature CAD    Home Medications:  Were reviewed and are listed in nursing record. and/or listed below  Prior to Admission medications    Medication Sig Start Date End Date Taking? Authorizing Provider   clopidogrel (PLAVIX) 75 MG tablet Take 1 tablet by mouth daily   Yes Alisa Camarillo MD   atorvastatin (LIPITOR) 80 MG tablet Take 1 tablet by mouth daily   Yes Alisa Camarillo MD   niacin 500 MG tablet Take 3 tablets by mouth daily (with breakfast)    Alisa Camarillo MD   ferrous sulfate (IRON 325) 325 (65 Fe) MG tablet Take 1 tablet by mouth daily (with breakfast)    Alisa Camarillo MD   zinc gluconate 50 MG tablet Take 1 tablet by mouth  allergies.     Review of Systems:     A 14 ROS obtained and negative except as mentioned in HPI.    Constitutional: there has been no unanticipated weight loss.   Eyes: No visual changes or diplopia. No scleral icterus.  ENT: No Headaches, hearing loss or vertigo. No mouth sores or sore throat.  Cardiovascular: No loss of consciousness. No hemoptysis, pleuritic pain, or phlebitis.  Respiratory: No cough or wheezing, no sputum production. No hematemesis.    Gastrointestinal: No abdominal pain, appetite loss, blood in stools. No change in bowel or bladder habits.  Genitourinary: No dysuria, or hematuria.  Musculoskeletal:  No gait disturbance, weakness or joint complaints.  Integumentary: No rash or pruritis.  Neurological: No headache, diplopia,numbness or tingling. No change in gait, balance, coordination, mood, affect, memory, mentation, behavior.  Psychiatric: No anxiety,  Endocrine: No malaise,  Hematologic/Lymphatic: No abnormal bruising  Allergic/Immunologic: No nasal congestion      Physical Examination:    Vitals:    08/19/24 1130   BP: (!) 144/86   Pulse: 70   Resp: 18   Temp: 97.8 °F (36.6 °C)   SpO2: 95%    Weight - Scale: 70.3 kg (155 lb)         General Appearance:  Alert, cooperative, no distress, appears stated age   Head:  Normocephalic, without obvious abnormality, atraumatic   Eyes:  PERRL   Nose: Nares normal,   Neck: Supple, JVP normal    Lungs:   Clear to auscultation bilaterally, respirations unlabored   Chest Wall:  No tenderness or deformity   Heart:  Regular rate and rhythm, normal S1, S2 normal, no murmur, I/VI HSM no S3 gallop   Abdomen:   Soft, non-tender, +bowel sounds   Extremities: no cyanosis, no clubbing , No  edema   Pulses: Symmetric extremities   Skin: no gross lesions or rashes   Pysch: Normal mood and affect   Neurologic: No gross deficits.  CN II - XII grossly intact        Labs  CBC:   Lab Results   Component Value Date/Time    WBC 9.7 08/19/2024 06:02 AM    RBC 4.12 08/19/2024  06:02 AM    HGB 13.0 08/19/2024 06:02 AM    HCT 39.2 08/19/2024 06:02 AM    MCV 95.1 08/19/2024 06:02 AM    RDW 13.9 08/19/2024 06:02 AM    PLT 57 08/19/2024 06:02 AM     CMP:    Lab Results   Component Value Date/Time     08/19/2024 06:02 AM    K 4.2 08/19/2024 06:02 AM    CL 97 08/19/2024 06:02 AM    CO2 27 08/19/2024 06:02 AM    BUN 12 08/19/2024 06:02 AM    CREATININE 0.7 08/19/2024 06:02 AM    GFRAA >60 10/17/2019 02:35 PM    GFRAA >60 05/30/2013 12:36 PM    AGRATIO 1.2 08/19/2024 06:02 AM    LABGLOM >90 08/19/2024 06:02 AM    GLUCOSE 160 08/19/2024 06:02 AM    CALCIUM 9.0 08/19/2024 06:02 AM    BILITOT 0.5 08/19/2024 06:02 AM    ALKPHOS 90 08/19/2024 06:02 AM    AST 24 08/19/2024 06:02 AM    ALT 15 08/19/2024 06:02 AM     PT/INR:  No results found for: \"PTINR\"  No results for input(s): \"CKTOTAL\", \"CKMB\", \"TROPONINI\" in the last 72 hours.    EKG:  SR with left axis and anterior infarct pattern with NSST/T wave changes.    Assessment  Patient Active Problem List   Diagnosis    CAD (coronary artery disease)    Hyperlipidemia    Essential hypertension    BCC (basal cell carcinoma), face    History of AAA (abdominal aortic aneurysm) repair    Basal cell carcinoma of eye    Acute pain of right knee    History of abdominal aortic aneurysm (AAA)    Thrombocytopenia (HCC)    Chest pain          Impression:  severe CAD with complex coronary intervention recently. NSTEMI.  Hematuria (new).  HTN.  Chronic thrombocytopenia.    Recommendations:    I had the opportunity to review the clinical symptoms and presentation of Lewis Muñozanastasiiabryson.     I recommend that the patient undergo further cardiac evaluation at [facility] as an outpatient.  Would manage medically here for now.  I discussed the patient with his primary cardiologist whom agrees with this initial plan.  Would treat with BB and topical nitrates.     Tobacco use was discussed with the patient and educated on the negative

## 2024-08-19 NOTE — PROGRESS NOTES
Patient admitted from ED to 4 PCU room 4313. Vitals obtained. No complains of pain. Telemetry monitoring applied.

## 2024-08-19 NOTE — PROGRESS NOTES
Point of Care Note:  General Surgery  Lewis Painter    12:58 PM  8/19/2024    Consulted for Thoracic surgery for ascending thoracic aneurysm. Patient seen and discussed with Dr. Pratt. Unfortunately, patient would need a cardiac surgeon for comment on his ascending thoracic aneurysm and we do not have CT surgery coverage at this hospital at this time.     - This was discussed with primary attending Dr. Murray.    Eufemia Hay DO, MS  PGY4, General Surgery  08/19/24  12:59 PM  [phone]    Inpatient consult to Vascular Surgery  Consult performed by: Eufemia Hay DO  Consult ordered by: Terri Law MD      Clarification - confirmed with Dr. Murray, this is for a ascending thoracic aneurysm. This would normally would be covered by cardiothoracic surgery, however, we do not have CT surgery coverage at this time. Per the patient, he normally follows with [facility].

## 2024-08-19 NOTE — PROGRESS NOTES
4 Eyes Skin Assessment     NAME:  Lewis Painter  YOB: 1945  MEDICAL RECORD NUMBER:  [id number]    The patient is being assessed for  Admission    I agree that at least one RN has performed a thorough Head to Toe Skin Assessment on the patient. ALL assessment sites listed below have been assessed.      Areas assessed by both nurses:    Head, Face, Ears, Shoulders, Back, Chest, Arms, Elbows, Hands, Sacrum. Buttock, Coccyx, Ischium, Legs. Feet and Heels, and Under Medical Devices   -scattered abrasions/bruising  Redness penis        Does the Patient have a Wound? No noted wound(s)       Ramo Prevention initiated by RN: No  Wound Care Orders initiated by RN: No    Pressure Injury (Stage 3,4, Unstageable, DTI, NWPT, and Complex wounds) if present, place Wound referral order by RN under : No    New Ostomies, if present place, Ostomy referral order under : No     Nurse 1 eSignature: Electronically signed by Jennifer Rosado RN on 8/19/24 at 12:22 PM EDT    **SHARE this note so that the co-signing nurse can place an eSignature**    Nurse 2 eSignature: Electronically signed by Jenni Salinas RN on 8/19/24 at 12:30 PM EDT

## 2024-08-19 NOTE — ED PROVIDER NOTES
THE [facility]  EMERGENCY DEPARTMENT ENCOUNTER          ATTENDING PHYSICIAN NOTE       Date of evaluation: 8/19/2024    ADDENDUM:      Care of this patient was assumed from Dr Kemp.  The patient was seen for Dysuria, Hematuria, Chest Pain, and Arm Pain  .  The patient's initial evaluation and plan have been discussed with the prior provider who initially evaluated the patient.  Nursing Notes, Past Medical Hx, Past Surgical Hx, Social Hx, Allergies, and Family Hx were all reviewed.    ASSESSMENT / PLAN  (MEDICAL DECISION MAKING)     Lewis Painter is a 78 y.o. male with hematuria and chest pain.  She had regarding the patient's chest pain, EKG shows no obvious gross ischemic changes, troponin initially elevated in the 30s, on repeat approximately doubled into the 70s.  He was given nitroglycerin for chest pain as well.  He is received full dose aspirin from us.  Given his hematuria, I will hold off on heparinizing the patient.  Given history of aortic repair, we did obtain CTA of the aorta shows no acute process, does shows some dilation of the thoracic aorta.  Cardiology was consulted.    Regarding his hematuria, he was able to urinate for us, he has nitrite positive urine with red cells, infection may be responsible for his hematuria.  Given dose of Rocephin.  We did obtain CT of the abdomen as well for evaluation for ureterolithiasis as possible cause of his hematuria, and some high density masslike findings are noted in the urinary bladder may be consistent with some clot/hemorrhage versus mass and recommend cystoscopy.  I think this can be continued to be followed up in the hospital as his chest pain and elevated troponin are dealt with as well.      Is this patient to be included in the SEP-1 core measure? No Exclusion criteria - the patient is NOT to be included for SEP-1 Core Measure due to: Infection is not suspected    Medical Decision Making  Amount and/or Complexity of Data  pneumonia. [BC]      ED Course User Index  [BC] Jose Manuel Kemp MD       The patient was given the following medications:  No orders of the defined types were placed in this encounter.      CONSULTS:  None      Maximilian Saunders MD  08/19/24 0636

## 2024-08-19 NOTE — ED NOTES
ED TO INPATIENT SBAR HANDOFF    Patient Name: Lewis Painter   :  1945  78 y.o.   MRN:  [id number]  Preferred Name    ED Room #:  A05/A05-05  Family/Caregiver Present no   Restraints no   Sitter no   Sepsis Risk Score      Situation  Code Status: Prior No additional code details.    Allergies: Patient has no known allergies.  Weight: Patient Vitals for the past 96 hrs (Last 3 readings):   Weight   24 0551 70.3 kg (155 lb)     Arrived from: home  Chief Complaint:   Chief Complaint   Patient presents with    Dysuria    Hematuria    Chest Pain    Arm Pain     Hospital Problem/Diagnosis:  Principal Problem:    Chest pain  Resolved Problems:    * No resolved hospital problems. *    Imaging:   CT ABDOMEN PELVIS W IV CONTRAST Additional Contrast? None   Final Result      New fusiform aneurysmal dilatation of the distal descending thoracic aorta   measuring up to 4.8 cm.      Mild fusiform ectasia of the ascending thoracic aorta at 4.3 cm.      Negative for dissection or acute chest process.      Emphysema.      EXAM: CT ABDOMEN AND PELVIS WITH CONTRAST      INDICATION: Hematuria, flank pain, concern for nephrolithiasis      COMPARISON: CTA of the abdomen 2009      TECHNIQUE: Axial CT imaging obtained from lung bases through pelvis. Axial   images and multiplanar reformatted images are provided for review.     Individualized dose optimization technique was used in order to meet ALARA   standards for radiation dose reduction.  In addition to vendor specific dose   reduction algorithms, the dose reduction techniques vary based on the specific   scanner utilized but frequently include automated exposure control, adjustment   of the mA and/or kV according to patient size, and use of iterative   reconstruction technique.      IV Contrast: 75 mL Isovue-370   Oral Contrast: None.      FINDINGS:      LUNG BASES: Clear.      LIVER: Normal.      GALLBLADDER AND BILIARY TREE: 4 mm stone within the  dependent gallbladder.   Gallbladder nondistended without surrounding inflammatory change. No intra- or   extrahepatic biliary dilatation.      PANCREAS: Normal.      SPLEEN: Normal.      ADRENAL GLANDS: Normal.      KIDNEYS AND URETERS: 3 mm nonobstructing stone within the midportion of the   right kidney. No ureteral calculi or hydronephrosis. 2.4 cm hypodense simple   cyst within the lower portion of the right kidney for which no specific   follow-up recommended. Previously described 8 mm hypodense lesion within the   posterior aspect of the left kidney slightly decreased in size compared with   prior study from 2009 consistent with a benign cyst again for which no specific   follow-up recommended. No new suspicious cortical lesions.      URINARY BLADDER: Masslike hyperdensity within the posterior aspect of the   urinary bladder measuring up to 2.6 cm AP by 5.2 cm LR image 89.      REPRODUCTIVE ORGANS: Mild prostatomegaly.      BOWEL: Normal caliber. Diverticulosis of the colon. No suspicious bowel wall   thickening or surrounding inflammatory fat stranding.      LYMPH NODES: No abnormally enlarged nodes.      PERITONEUM/RETROPERITONEUM: No ascites or free air.      VESSELS: Prior abdominal aorta repair again noted, with slightly more prominent   dilatation of the infrarenal aorta just below the level of the renal arteries   measuring up to 3.9 cm on today's study previously measuring up to 3.2 cm. No   evidence of dissection. The inferior vena cava, hepatic veins and portal venous   system are patent.      ABDOMINAL WALL: Normal.      BONES: No acute or suspicious bony abnormalities.      IMPRESSION:      Masslike hyperdensity within the posterior aspect of the urinary bladder,   potentially in some part secondary to blood products/clot. Further evaluation   with cystoscopy recommended.      Status post aortic aneurysm repair with slightly more prominent dilatation of   the infrarenal aorta at 3.9 cm without  Lymphocytes Absolute 0.8 (*)     Monocytes Absolute 1.5 (*)     All other components within normal limits   COMPREHENSIVE METABOLIC PANEL W/ REFLEX TO MG FOR LOW K - Abnormal; Notable for the following components:    Chloride 97 (*)     Glucose 160 (*)     Creatinine 0.7 (*)     All other components within normal limits   BLOOD GAS, VENOUS - Abnormal; Notable for the following components:    HCO3, Venous 28.4 (*)     All other components within normal limits   TROPONIN - Abnormal; Notable for the following components:    Troponin, High Sensitivity 32 (*)     All other components within normal limits   TROPONIN - Abnormal; Notable for the following components:    Troponin, High Sensitivity 73 (*)     All other components within normal limits   URINALYSIS WITH REFLEX TO CULTURE - Abnormal; Notable for the following components:    Color, UA RED (*)     Clarity, UA TURBID (*)     Bilirubin, Urine SMALL (*)     Ketones, Urine 15 (*)     Blood, Urine LARGE (*)     Protein, UA >=300 (*)     Urobilinogen, Urine 2.0 (*)     Nitrite, Urine POSITIVE (*)     Leukocyte Esterase, Urine MODERATE (*)     All other components within normal limits   MICROSCOPIC URINALYSIS - Abnormal; Notable for the following components:    RBC, UA >100 (*)     All other components within normal limits     Critical values: no     Abnormal Assessment Findings:     Background  History:   Past Medical History:   Diagnosis Date    BCC (basal cell carcinoma)RT medial periorbital region     CAD (coronary artery disease)     arrested after AAA repair    Cellulitis and abscess of leg 7/18/2012    Cellulitis of hand 12/13/2010    Hyperlipidemia     Hypertension     MRSA infection 7/18/2012    LLE       Assessment    Vitals/MEWS:    Level of Consciousness: Alert (0)   Vitals:    08/19/24 0638 08/19/24 0658 08/19/24 0758 08/19/24 1034   BP:   (!) 143/82 (!) 135/94   Pulse: 80 77 73 83   Resp: 16 18  20   Temp:       TempSrc:       SpO2: 91% 92% 97% 94%

## 2024-08-20 ENCOUNTER — ANESTHESIA (OUTPATIENT)
Dept: OPERATING ROOM | Age: 79
DRG: 695 | End: 2024-08-20
Payer: MEDICARE

## 2024-08-20 ENCOUNTER — ANESTHESIA EVENT (OUTPATIENT)
Dept: OPERATING ROOM | Age: 79
DRG: 695 | End: 2024-08-20
Payer: MEDICARE

## 2024-08-20 ENCOUNTER — APPOINTMENT (OUTPATIENT)
Age: 79
DRG: 695 | End: 2024-08-20
Attending: STUDENT IN AN ORGANIZED HEALTH CARE EDUCATION/TRAINING PROGRAM
Payer: MEDICARE

## 2024-08-20 LAB
CHOLEST SERPL-MCNC: 93 MG/DL (ref 0–199)
DEPRECATED RDW RBC AUTO: 13.9 % (ref 12.4–15.4)
ECHO AO ASC DIAM: 3.8 CM
ECHO AO ASCENDING AORTA INDEX: 2.03 CM/M2
ECHO AO ROOT DIAM: 3.4 CM
ECHO AO ROOT INDEX: 1.82 CM/M2
ECHO AV AREA PEAK VELOCITY: 2.6 CM2
ECHO AV AREA VTI: 2.9 CM2
ECHO AV AREA/BSA PEAK VELOCITY: 1.4 CM2/M2
ECHO AV AREA/BSA VTI: 1.6 CM2/M2
ECHO AV MEAN GRADIENT: 4 MMHG
ECHO AV MEAN VELOCITY: 0.9 M/S
ECHO AV PEAK GRADIENT: 7 MMHG
ECHO AV PEAK VELOCITY: 1.3 M/S
ECHO AV VELOCITY RATIO: 0.62
ECHO AV VTI: 23.2 CM
ECHO BSA: 1.86 M2
ECHO LA AREA 2C: 20 CM2
ECHO LA AREA 4C: 15.9 CM2
ECHO LA DIAMETER INDEX: 1.71 CM/M2
ECHO LA DIAMETER: 3.2 CM
ECHO LA MAJOR AXIS: 5.3 CM
ECHO LA MINOR AXIS: 5.4 CM
ECHO LA TO AORTIC ROOT RATIO: 0.94
ECHO LA VOL BP: 48 ML (ref 18–58)
ECHO LA VOL MOD A2C: 60 ML (ref 18–58)
ECHO LA VOL MOD A4C: 37 ML (ref 18–58)
ECHO LA VOL/BSA BIPLANE: 26 ML/M2 (ref 16–34)
ECHO LA VOLUME INDEX MOD A2C: 32 ML/M2 (ref 16–34)
ECHO LA VOLUME INDEX MOD A4C: 20 ML/M2 (ref 16–34)
ECHO LV E' LATERAL VELOCITY: 6 CM/S
ECHO LV E' SEPTAL VELOCITY: 5 CM/S
ECHO LV EDV A2C: 124 ML
ECHO LV EDV A4C: 105 ML
ECHO LV EDV INDEX A4C: 56 ML/M2
ECHO LV EDV NDEX A2C: 66 ML/M2
ECHO LV EF PHYSICIAN: 40 %
ECHO LV EJECTION FRACTION A2C: 51 %
ECHO LV EJECTION FRACTION A4C: 41 %
ECHO LV EJECTION FRACTION BIPLANE: 47 % (ref 55–100)
ECHO LV ESV A2C: 61 ML
ECHO LV ESV A4C: 63 ML
ECHO LV ESV INDEX A2C: 33 ML/M2
ECHO LV ESV INDEX A4C: 34 ML/M2
ECHO LV FRACTIONAL SHORTENING: 33 % (ref 28–44)
ECHO LV INTERNAL DIMENSION DIASTOLE INDEX: 2.46 CM/M2
ECHO LV INTERNAL DIMENSION DIASTOLIC: 4.6 CM (ref 4.2–5.9)
ECHO LV INTERNAL DIMENSION SYSTOLIC INDEX: 1.66 CM/M2
ECHO LV INTERNAL DIMENSION SYSTOLIC: 3.1 CM
ECHO LV IVSD: 1.2 CM (ref 0.6–1)
ECHO LV MASS 2D: 181.2 G (ref 88–224)
ECHO LV MASS INDEX 2D: 96.9 G/M2 (ref 49–115)
ECHO LV POSTERIOR WALL DIASTOLIC: 1 CM (ref 0.6–1)
ECHO LV RELATIVE WALL THICKNESS RATIO: 0.43
ECHO LVOT AREA: 4.5 CM2
ECHO LVOT AV VTI INDEX: 0.64
ECHO LVOT DIAM: 2.4 CM
ECHO LVOT MEAN GRADIENT: 2 MMHG
ECHO LVOT PEAK GRADIENT: 2 MMHG
ECHO LVOT PEAK VELOCITY: 0.8 M/S
ECHO LVOT STROKE VOLUME INDEX: 35.8 ML/M2
ECHO LVOT SV: 66.9 ML
ECHO LVOT VTI: 14.8 CM
ECHO MV A VELOCITY: 0.47 M/S
ECHO MV AREA VTI: 4.2 CM2
ECHO MV E DECELERATION TIME (DT): 309 MS
ECHO MV E VELOCITY: 0.37 M/S
ECHO MV E/A RATIO: 0.79
ECHO MV E/E' LATERAL: 6.17
ECHO MV E/E' RATIO (AVERAGED): 6.78
ECHO MV E/E' SEPTAL: 7.4
ECHO MV LVOT VTI INDEX: 1.07
ECHO MV MAX VELOCITY: 0.7 M/S
ECHO MV MEAN GRADIENT: 1 MMHG
ECHO MV MEAN VELOCITY: 0.3 M/S
ECHO MV PEAK GRADIENT: 2 MMHG
ECHO MV VTI: 15.8 CM
ECHO PULMONARY ARTERY END DIASTOLIC PRESSURE: 4 MMHG
ECHO PV ACCELERATION TIME (AT): 114 MS
ECHO PV MAX VELOCITY: 1 M/S
ECHO PV MAX VELOCITY: 1 M/S
ECHO PV PEAK GRADIENT: 4 MMHG
ECHO RA AREA 4C: 16.3 CM2
ECHO RA END SYSTOLIC VOLUME APICAL 4 CHAMBER INDEX BSA: 24 ML/M2
ECHO RA VOLUME: 45 ML
ECHO RV BASAL DIMENSION: 3.9 CM
ECHO RV FREE WALL PEAK S': 13 CM/S
ECHO RV TAPSE: 2 CM (ref 1.7–?)
ECHO TV REGURGITANT MAX VELOCITY: 2.27 M/S
ECHO TV REGURGITANT PEAK GRADIENT: 21 MMHG
EST. AVERAGE GLUCOSE BLD GHB EST-MCNC: 96.8 MG/DL
HBA1C MFR BLD: 5 %
HCT VFR BLD AUTO: 38.4 % (ref 40.5–52.5)
HDLC SERPL-MCNC: 28 MG/DL (ref 40–60)
HGB BLD-MCNC: 12.7 G/DL (ref 13.5–17.5)
LDLC SERPL CALC-MCNC: 53 MG/DL
MCH RBC QN AUTO: 31.5 PG (ref 26–34)
MCHC RBC AUTO-ENTMCNC: 33.2 G/DL (ref 31–36)
MCV RBC AUTO: 94.9 FL (ref 80–100)
PLATELET # BLD AUTO: 59 K/UL (ref 135–450)
PMV BLD AUTO: 11.4 FL (ref 5–10.5)
RBC # BLD AUTO: 4.04 M/UL (ref 4.2–5.9)
TRIGL SERPL-MCNC: 60 MG/DL (ref 0–150)
VLDLC SERPL CALC-MCNC: 12 MG/DL
WBC # BLD AUTO: 11.7 K/UL (ref 4–11)

## 2024-08-20 PROCEDURE — 99233 SBSQ HOSP IP/OBS HIGH 50: CPT | Performed by: NURSE PRACTITIONER

## 2024-08-20 PROCEDURE — 6370000000 HC RX 637 (ALT 250 FOR IP): Performed by: UROLOGY

## 2024-08-20 PROCEDURE — 3600000013 HC SURGERY LEVEL 3 ADDTL 15MIN: Performed by: UROLOGY

## 2024-08-20 PROCEDURE — 2500000003 HC RX 250 WO HCPCS: Performed by: NURSE ANESTHETIST, CERTIFIED REGISTERED

## 2024-08-20 PROCEDURE — 2580000003 HC RX 258: Performed by: STUDENT IN AN ORGANIZED HEALTH CARE EDUCATION/TRAINING PROGRAM

## 2024-08-20 PROCEDURE — 3600000003 HC SURGERY LEVEL 3 BASE: Performed by: UROLOGY

## 2024-08-20 PROCEDURE — 7100000000 HC PACU RECOVERY - FIRST 15 MIN: Performed by: UROLOGY

## 2024-08-20 PROCEDURE — 6370000000 HC RX 637 (ALT 250 FOR IP): Performed by: INTERNAL MEDICINE

## 2024-08-20 PROCEDURE — 1200000000 HC SEMI PRIVATE

## 2024-08-20 PROCEDURE — 6360000002 HC RX W HCPCS: Performed by: STUDENT IN AN ORGANIZED HEALTH CARE EDUCATION/TRAINING PROGRAM

## 2024-08-20 PROCEDURE — 7100000001 HC PACU RECOVERY - ADDTL 15 MIN: Performed by: UROLOGY

## 2024-08-20 PROCEDURE — 85027 COMPLETE CBC AUTOMATED: CPT

## 2024-08-20 PROCEDURE — 6360000002 HC RX W HCPCS: Performed by: NURSE ANESTHETIST, CERTIFIED REGISTERED

## 2024-08-20 PROCEDURE — 2580000003 HC RX 258: Performed by: UROLOGY

## 2024-08-20 PROCEDURE — 2580000003 HC RX 258: Performed by: NURSE ANESTHETIST, CERTIFIED REGISTERED

## 2024-08-20 PROCEDURE — C8929 TTE W OR WO FOL WCON,DOPPLER: HCPCS

## 2024-08-20 PROCEDURE — 93306 TTE W/DOPPLER COMPLETE: CPT | Performed by: INTERNAL MEDICINE

## 2024-08-20 PROCEDURE — 0TCB8ZZ EXTIRPATION OF MATTER FROM BLADDER, VIA NATURAL OR ARTIFICIAL OPENING ENDOSCOPIC: ICD-10-PCS | Performed by: UROLOGY

## 2024-08-20 PROCEDURE — 2709999900 HC NON-CHARGEABLE SUPPLY: Performed by: UROLOGY

## 2024-08-20 PROCEDURE — 3700000001 HC ADD 15 MINUTES (ANESTHESIA): Performed by: UROLOGY

## 2024-08-20 PROCEDURE — 3700000000 HC ANESTHESIA ATTENDED CARE: Performed by: UROLOGY

## 2024-08-20 PROCEDURE — 36415 COLL VENOUS BLD VENIPUNCTURE: CPT

## 2024-08-20 RX ORDER — ONDANSETRON 2 MG/ML
INJECTION INTRAMUSCULAR; INTRAVENOUS PRN
Status: DISCONTINUED | OUTPATIENT
Start: 2024-08-20 | End: 2024-08-20 | Stop reason: SDUPTHER

## 2024-08-20 RX ORDER — ACETAMINOPHEN 325 MG/1
650 TABLET ORAL
Status: DISCONTINUED | OUTPATIENT
Start: 2024-08-20 | End: 2024-08-20 | Stop reason: HOSPADM

## 2024-08-20 RX ORDER — PROPOFOL 10 MG/ML
INJECTION, EMULSION INTRAVENOUS PRN
Status: DISCONTINUED | OUTPATIENT
Start: 2024-08-20 | End: 2024-08-20 | Stop reason: SDUPTHER

## 2024-08-20 RX ORDER — ONDANSETRON 2 MG/ML
4 INJECTION INTRAMUSCULAR; INTRAVENOUS
Status: DISCONTINUED | OUTPATIENT
Start: 2024-08-20 | End: 2024-08-20 | Stop reason: HOSPADM

## 2024-08-20 RX ORDER — DEXAMETHASONE SODIUM PHOSPHATE 4 MG/ML
INJECTION, SOLUTION INTRA-ARTICULAR; INTRALESIONAL; INTRAMUSCULAR; INTRAVENOUS; SOFT TISSUE PRN
Status: DISCONTINUED | OUTPATIENT
Start: 2024-08-20 | End: 2024-08-20 | Stop reason: SDUPTHER

## 2024-08-20 RX ORDER — SODIUM CHLORIDE, SODIUM LACTATE, POTASSIUM CHLORIDE, CALCIUM CHLORIDE 600; 310; 30; 20 MG/100ML; MG/100ML; MG/100ML; MG/100ML
INJECTION, SOLUTION INTRAVENOUS CONTINUOUS PRN
Status: DISCONTINUED | OUTPATIENT
Start: 2024-08-20 | End: 2024-08-20 | Stop reason: SDUPTHER

## 2024-08-20 RX ORDER — SODIUM CHLORIDE 0.9 % (FLUSH) 0.9 %
5-40 SYRINGE (ML) INJECTION EVERY 12 HOURS SCHEDULED
Status: DISCONTINUED | OUTPATIENT
Start: 2024-08-20 | End: 2024-08-20 | Stop reason: HOSPADM

## 2024-08-20 RX ORDER — NALOXONE HYDROCHLORIDE 0.4 MG/ML
INJECTION, SOLUTION INTRAMUSCULAR; INTRAVENOUS; SUBCUTANEOUS PRN
Status: DISCONTINUED | OUTPATIENT
Start: 2024-08-20 | End: 2024-08-20 | Stop reason: HOSPADM

## 2024-08-20 RX ORDER — PROCHLORPERAZINE EDISYLATE 5 MG/ML
5 INJECTION INTRAMUSCULAR; INTRAVENOUS
Status: DISCONTINUED | OUTPATIENT
Start: 2024-08-20 | End: 2024-08-20 | Stop reason: HOSPADM

## 2024-08-20 RX ORDER — ROCURONIUM BROMIDE 10 MG/ML
INJECTION, SOLUTION INTRAVENOUS PRN
Status: DISCONTINUED | OUTPATIENT
Start: 2024-08-20 | End: 2024-08-20 | Stop reason: SDUPTHER

## 2024-08-20 RX ORDER — SODIUM CHLORIDE 0.9 % (FLUSH) 0.9 %
5-40 SYRINGE (ML) INJECTION PRN
Status: DISCONTINUED | OUTPATIENT
Start: 2024-08-20 | End: 2024-08-20 | Stop reason: HOSPADM

## 2024-08-20 RX ORDER — HYDROMORPHONE HYDROCHLORIDE 1 MG/ML
0.5 INJECTION, SOLUTION INTRAMUSCULAR; INTRAVENOUS; SUBCUTANEOUS EVERY 5 MIN PRN
Status: DISCONTINUED | OUTPATIENT
Start: 2024-08-20 | End: 2024-08-20 | Stop reason: HOSPADM

## 2024-08-20 RX ORDER — FENTANYL CITRATE 50 UG/ML
25 INJECTION, SOLUTION INTRAMUSCULAR; INTRAVENOUS EVERY 5 MIN PRN
Status: DISCONTINUED | OUTPATIENT
Start: 2024-08-20 | End: 2024-08-20 | Stop reason: HOSPADM

## 2024-08-20 RX ORDER — SODIUM CHLORIDE 9 MG/ML
INJECTION, SOLUTION INTRAVENOUS PRN
Status: DISCONTINUED | OUTPATIENT
Start: 2024-08-20 | End: 2024-08-20 | Stop reason: HOSPADM

## 2024-08-20 RX ORDER — IPRATROPIUM BROMIDE AND ALBUTEROL SULFATE 2.5; .5 MG/3ML; MG/3ML
1 SOLUTION RESPIRATORY (INHALATION)
Status: DISCONTINUED | OUTPATIENT
Start: 2024-08-20 | End: 2024-08-20 | Stop reason: HOSPADM

## 2024-08-20 RX ORDER — EPINEPHRINE 1 MG/ML
INJECTION, SOLUTION INTRAMUSCULAR; SUBCUTANEOUS PRN
Status: DISCONTINUED | OUTPATIENT
Start: 2024-08-20 | End: 2024-08-20 | Stop reason: SDUPTHER

## 2024-08-20 RX ORDER — LIDOCAINE HYDROCHLORIDE 20 MG/ML
INJECTION, SOLUTION INFILTRATION; PERINEURAL PRN
Status: DISCONTINUED | OUTPATIENT
Start: 2024-08-20 | End: 2024-08-20 | Stop reason: SDUPTHER

## 2024-08-20 RX ORDER — LABETALOL HYDROCHLORIDE 5 MG/ML
10 INJECTION, SOLUTION INTRAVENOUS
Status: DISCONTINUED | OUTPATIENT
Start: 2024-08-20 | End: 2024-08-20 | Stop reason: HOSPADM

## 2024-08-20 RX ADMIN — PHENYLEPHRINE HYDROCHLORIDE 50 MCG: 10 INJECTION INTRAVENOUS at 16:12

## 2024-08-20 RX ADMIN — SODIUM CHLORIDE, PRESERVATIVE FREE 10 ML: 5 INJECTION INTRAVENOUS at 08:42

## 2024-08-20 RX ADMIN — LIDOCAINE HYDROCHLORIDE 100 MG: 20 INJECTION, SOLUTION INFILTRATION; PERINEURAL at 16:11

## 2024-08-20 RX ADMIN — SODIUM CHLORIDE, SODIUM LACTATE, POTASSIUM CHLORIDE, AND CALCIUM CHLORIDE: .6; .31; .03; .02 INJECTION, SOLUTION INTRAVENOUS at 16:04

## 2024-08-20 RX ADMIN — NITROGLYCERIN 0.5 INCH: 20 OINTMENT TOPICAL at 03:46

## 2024-08-20 RX ADMIN — PROPOFOL 70 MG: 10 INJECTION, EMULSION INTRAVENOUS at 16:11

## 2024-08-20 RX ADMIN — ROCURONIUM BROMIDE 50 MG: 10 INJECTION, SOLUTION INTRAVENOUS at 16:11

## 2024-08-20 RX ADMIN — DEXAMETHASONE SODIUM PHOSPHATE 8 MG: 4 INJECTION INTRA-ARTICULAR; INTRALESIONAL; INTRAMUSCULAR; INTRAVENOUS; SOFT TISSUE at 16:17

## 2024-08-20 RX ADMIN — NITROGLYCERIN 0.5 INCH: 20 OINTMENT TOPICAL at 08:41

## 2024-08-20 RX ADMIN — NITROGLYCERIN 0.5 INCH: 20 OINTMENT TOPICAL at 15:21

## 2024-08-20 RX ADMIN — SUGAMMADEX 300 MG: 100 INJECTION, SOLUTION INTRAVENOUS at 16:31

## 2024-08-20 RX ADMIN — NITROGLYCERIN 0.5 INCH: 20 OINTMENT TOPICAL at 20:47

## 2024-08-20 RX ADMIN — METOPROLOL SUCCINATE 25 MG: 25 TABLET, EXTENDED RELEASE ORAL at 08:38

## 2024-08-20 RX ADMIN — WATER 1000 MG: 1 INJECTION INTRAMUSCULAR; INTRAVENOUS; SUBCUTANEOUS at 08:38

## 2024-08-20 RX ADMIN — ATORVASTATIN CALCIUM 40 MG: 40 TABLET, FILM COATED ORAL at 20:47

## 2024-08-20 RX ADMIN — EPINEPHRINE 10 MCG: 1 INJECTION INTRAMUSCULAR; INTRAVENOUS; SUBCUTANEOUS at 16:15

## 2024-08-20 RX ADMIN — ONDANSETRON 8 MG: 2 INJECTION INTRAMUSCULAR; INTRAVENOUS at 16:08

## 2024-08-20 RX ADMIN — SODIUM CHLORIDE, PRESERVATIVE FREE 10 ML: 5 INJECTION INTRAVENOUS at 20:48

## 2024-08-20 RX ADMIN — EPINEPHRINE 10 MCG: 1 INJECTION INTRAMUSCULAR; INTRAVENOUS; SUBCUTANEOUS at 16:19

## 2024-08-20 ASSESSMENT — PAIN SCALES - GENERAL
PAINLEVEL_OUTOF10: 0

## 2024-08-20 ASSESSMENT — PAIN - FUNCTIONAL ASSESSMENT: PAIN_FUNCTIONAL_ASSESSMENT: NONE - DENIES PAIN

## 2024-08-20 NOTE — ANESTHESIA PRE PROCEDURE
\"HCG\", \"HCGQUANT\"     ABGs: No results found for: \"PHART\", \"PO2ART\", \"MGK8KAQ\", \"HIG7EOI\", \"BEART\", \"D9PAMFVM\"     Type & Screen (If Applicable):  Lab Results   Component Value Date    LABABO B 07/18/2012       Drug/Infectious Status (If Applicable):  No results found for: \"HIV\", \"HEPCAB\"    COVID-19 Screening (If Applicable):   Lab Results   Component Value Date/Time    COVID19 Not Detected 12/24/2020 09:26 AM    COVID19 Not Detected 07/08/2020 11:20 AM           Anesthesia Evaluation  Patient summary reviewed and Nursing notes reviewed  Airway: Mallampati: II  TM distance: >3 FB   Neck ROM: full  Mouth opening: > = 3 FB   Dental: normal exam         Pulmonary: breath sounds clear to auscultation                             Cardiovascular:  Exercise tolerance: good (>4 METS)  (+) hypertension:, CAD:        Rhythm: regular  Rate: normal                    Neuro/Psych:               GI/Hepatic/Renal:             Endo/Other:    (+) blood dyscrasia: thrombocytopenia:..                 Abdominal:             Vascular:   + PVD, aortic or cerebral.       Other Findings:       Anesthesia Plan      general     ASA 3       Induction: intravenous.      Anesthetic plan and risks discussed with patient.    Use of blood products discussed with patient whom.    Plan discussed with surgical team and CRNA.                Reji Carrasco MD   8/20/2024

## 2024-08-20 NOTE — PLAN OF CARE
Problem: Discharge Planning  Goal: Discharge to home or other facility with appropriate resources  8/19/2024 2221 by Jennifer Harris, RN  Outcome: Progressing  Flowsheets (Taken 8/19/2024 2000)  Discharge to home or other facility with appropriate resources: Identify barriers to discharge with patient and caregiver  8/19/2024 1939 by Jenni Salinas RN  Outcome: Progressing  Flowsheets (Taken 8/19/2024 1800)  Discharge to home or other facility with appropriate resources: Identify barriers to discharge with patient and caregiver     Problem: Safety - Adult  Goal: Free from fall injury  8/19/2024 2221 by Jennifer Harris, RN  Outcome: Progressing  8/19/2024 1939 by Jenni Salinas RN  Outcome: Progressing  Flowsheets (Taken 8/19/2024 1800)  Free From Fall Injury: Instruct family/caregiver on patient safety     Problem: Pain  Goal: Verbalizes/displays adequate comfort level or baseline comfort level  8/19/2024 2221 by Jennifer Harris RN  Outcome: Progressing  Flowsheets (Taken 8/19/2024 1940)  Verbalizes/displays adequate comfort level or baseline comfort level: Encourage patient to monitor pain and request assistance  8/19/2024 1939 by Jenni Salinas RN  Outcome: Progressing  Flowsheets (Taken 8/19/2024 1800)  Verbalizes/displays adequate comfort level or baseline comfort level:   Encourage patient to monitor pain and request assistance   Assess pain using appropriate pain scale

## 2024-08-20 NOTE — CONSULTS
Urology Attending Consult Note      Reason for Consultation: Hematuria    History: 79 yo M with history of severe CAD with recent PCI/CABG, history of AAA repair, chronic thrombocytopenia and no previous  history who presented to the ER yesterday with hematuria and straining to urinate. Also noted to have some chest pain when he strained to urinate. He had a CT showing masslike density in the posterior aspect of the bladder concerning for clots. He is normally on Plavix but last dose was . Given his complex cardiac history and new onset hematuria, he was admitted for further management.     Family History, Social History, Review of Systems:  Reviewed and agreed to as per chart    Vitals:  /76   Pulse 87   Temp 98.7 °F (37.1 °C) (Oral)   Resp 18   Ht 1.778 m (5' 10\")   Wt 70.2 kg (154 lb 12.2 oz)   SpO2 95%   BMI 22.21 kg/m²   Temp  Av.3 °F (36.8 °C)  Min: 97.8 °F (36.6 °C)  Max: 99.2 °F (37.3 °C)    Intake/Output Summary (Last 24 hours) at 2024 0915  Last data filed at 2024 0842  Gross per 24 hour   Intake 515 ml   Output 2400 ml   Net -1885 ml         Physical:  Well developed, well nourished in no acute distress  Mood indicates no abnormalities. Pt doesn’t appear depressed  Orientated to time and place  Neck is supple, trachea is midline  Respiratory effort is normal       Male :  Penis appears normal and circumcised  Urethral meatus is normal in size and location  Scrotum appears normal and both testicles appear normal in size and location    Labs:  WBC:    Lab Results   Component Value Date/Time    WBC 11.7 2024 07:13 AM     Hemoglobin/Hematocrit:    Lab Results   Component Value Date/Time    HGB 12.7 2024 07:13 AM    HCT 38.4 2024 07:13 AM     BMP:    Lab Results   Component Value Date/Time     2024 06:02 AM    K 4.2 2024 06:02 AM    CL 97 2024 06:02 AM    CO2 27 2024 06:02 AM    BUN 12 2024 06:02 AM    CREATININE

## 2024-08-20 NOTE — PROGRESS NOTES
V2.0    [facility] [place]      Name:  Lewis Painter /Age/Sex: 1945  (78 y.o. male)   MRN & CSN:  [id number] & [id number] Encounter Date/Time: 2024 6:06 PM EDT   Location:  [facility]4313-01 PCP: Edu Brice MD     Attending:Oscar Tello MD       Hospital Day: 2    Assessment and Recommendations   Lewis Painter is a 78 y.o. male with pmh of AAA repair, coronary artery disease status post CABG and PCI, hypertension, dyslipidemia, thrombocytopenia  who presents with Chest pain    #Hematuria, unclear etiology  - Urology consulted  - CBI  - Trend CBC  - Plan for cystoscopy today  --Some degree of cardiac risk given concern for NSTEMI on admission. Patient reports no chest pain and is being managed medically per Cardiology, if they are okay with proceeding with procedure no additional medical barriers to procedure from my perspective    #Chest pain, improved  #elevated troponin with concern for NSTEMI  - Cardiology consulted  - No heparin gtt due to clinical improvement and active hematuria  - ASA, statin, beta-blocker, topical nitrate  - Needs outpatient follow-up with primary cardiologist at . Patient has complex coronary anatomy and intervention hx and per Cards would best have further intervention at facility with CT surgery availability     #Thoracic aortic aneurysm without dissection  - Both ascending and descending noted: 4.3 cm ascending and 4.8 cm descending  - Currently asymptomatic and below diameter threshold for surgical repair  - Control BP  - Needs to establish with CT surgeon as outpatient for monitoring    Diet ADULT DIET; Regular   DVT Prophylaxis [] Lovenox, []  Heparin, [] SCDs, [] Ambulation,  [] Eliquis, [] Xarelto  [] Coumadin   Code Status Full Code   Disposition From: Home  Expected Disposition: TBD  Estimated Date of Discharge: 1-2 days  Patient requires continued admission due to CBI   Surrogate Decision Maker/ POA  Jorje Bland     Personally  chest prior to and following administration of IV contrast. Axial images, multiplanar reformatted images and 3D post-processing, maximum intensity projection images were obtained for CT angiographic technique.   Individualized dose optimization technique was used in order to meet ALARA standards for radiation dose reduction.  In addition to vendor specific dose reduction algorithms, the dose reduction techniques vary based on the specific scanner utilized but frequently include automated exposure control, adjustment of the mA and/or kV according to patient size, and use of iterative reconstruction technique. CONTRAST: 75 mL Isovue-370 IV FINDINGS: LUNGS / AIRWAYS: Airways are patent. Moderate centrilobular emphysema. Other than some densely calcified granulomas in the lungs are clear without airspace disease or suspicious nodularity. PLEURA: No pleural effusions or significant pleural thickening. AORTA: Mild fusiform ectasia of the ascending thoracic aorta measuring up to 4.3 cm. Aortic arch and proximal descending thoracic aorta normal in caliber. There is new fusiform aneurysmal dilatation of the distal descending thoracic aorta compared with prior study from 2009 measuring 4.4 cm AP by 4.8 cm LR. Calcified and noncalcified atherosclerotic plaque throughout the thoracic aorta without dissection or intramural hematoma. HEART: Heart size normal. Prior median sternotomy and CABG. Main pulmonary artery normal in caliber. No pericardial effusion. ADENOPATHY: None. CHEST WALL / LOWER NECK: No significant abnormality. UPPER ABDOMEN: No significant abnormality. BONES: No significant abnormality.     New fusiform aneurysmal dilatation of the distal descending thoracic aorta measuring up to 4.8 cm. Mild fusiform ectasia of the ascending thoracic aorta at 4.3 cm. Negative for dissection or acute chest process. Emphysema. EXAM: CT ABDOMEN AND PELVIS WITH CONTRAST INDICATION: Hematuria, flank pain, concern for nephrolithiasis

## 2024-08-20 NOTE — PROGRESS NOTES
PACU Transfer Note    Vitals:    08/20/24 1715   BP: 134/77   Pulse: 74   Resp: 18   Temp: 98 °F (36.7 °C)   SpO2: 95%       In: 479 [I.V.:479]  Out: 2300 [Urine:2300]    Pain assessment:  none  Pain Level: 0    Report given to Receiving unit RN.    8/20/2024 5:27 PM

## 2024-08-20 NOTE — PROGRESS NOTES
[facility]   Cardiology  Note   Pt of Dr Quynh MAGANA, FACC   Carolyn Looney RN, FNP APRN CVNP  Date: 8/20/2024  Admit Date: 8/19/2024       CC:cp    Following cardiology today for: cp after straining to urinate. Passed clots in urine and trop 39 to 73 to 149. Had left main to left Cx intervention. Sees Dr. Johnson from  Cardiology. Pt has complex coronary anatomy and may be best treated in a surgical backup hospital if cath is ultimately needed.     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 No major events overnight.   Denies having chest pain, palpitations, shortness of breath, orthopnea/PND, cough, or dizziness at the time of this visit.     Medical Decision Making:  Discussion of patient care with other providers  Patient seen and examined. Clinical notes reviewed. Telemetry reviewed / Pertinent labs, diagnostic, device, and imaging results reviewed as a part of this visit  I spent a total of 50 minutes and greater than 50% of the time was spent counseling with patient  coordinating care regarding her diagnosis, treatments and plan of care    Past Medical History:  Past Medical History:   Diagnosis Date    BCC (basal cell carcinoma)RT medial periorbital region     CAD (coronary artery disease)     arrested after AAA repair    Cellulitis and abscess of leg 7/18/2012    Cellulitis of hand 12/13/2010    Hyperlipidemia     Hypertension     MRSA infection 7/18/2012    LLE      Scheduled Meds:   sodium chloride flush  5-40 mL IntraVENous 2 times per day    aspirin  81 mg Oral Daily    atorvastatin  40 mg Oral Nightly    cefTRIAXone (ROCEPHIN) IV  1,000 mg IntraVENous Q24H    enoxaparin  40 mg SubCUTAneous Daily    nitroglycerin  0.5 inch Topical 4 times per day    metoprolol succinate  25 mg

## 2024-08-20 NOTE — BRIEF OP NOTE
Brief Postoperative Note      Patient: Lewis Painter  YOB: 1945  MRN: [id number]    Date of Procedure: 8/20/2024    Pre-Op Diagnosis Codes:      * Gross hematuria [R31.0]    Post-Op Diagnosis: Same       Procedure(s):  CYSTOSCOPY EVACUATION OF CLOTS    Surgeon(s):  Darrell Gonzalez MD    Assistant:  * No surgical staff found *    Anesthesia: General    Estimated Blood Loss (mL): Minimal    Complications: None    Specimens:   * No specimens in log *    Implants:  * No implants in log *      Drains:   Urinary Catheter 08/20/24 3 Way (Active)       [REMOVED] Urinary Catheter 08/19/24 3 Way (Removed)   $ Urethral catheter insertion $ Not inserted for procedure 08/19/24 1516   Catheter Indications Urinary retention (acute or chronic), continuous bladder irrigation or bladder outlet obstruction 08/20/24 0400   Site Assessment Red;Pink 08/20/24 1515   Urine Color Cherry;Yellow;Diluted 08/20/24 1515   Urine Appearance Clots 08/20/24 1515   Collection Container Standard 08/20/24 1515   Securement Method Securing device (Describe) 08/20/24 1515   Catheter Care  Soap and water 08/20/24 1000   Catheter Best Practices  Drainage tube clipped to bed;Catheter secured to thigh;Tamper seal intact;Bag below bladder;Bag not on floor;Lack of dependent loop in tubing;Drainage bag less than half full 08/20/24 1515   Status Continuous bladder irrigation 08/20/24 1515   $ Bladder irrigation simple- 1X PER DAY $ Yes 08/19/24 2000   Rate Moderate 08/20/24 1515   Irrigant Normal saline 08/19/24 1832   CBI Irrigation Intake (mL) 6000 mL 08/20/24 1515   CBI Lerma Output (mL) 6100 mL 08/20/24 1515   CBI Net Output (mL) 100 mL 08/20/24 1515       Findings:  Infection Present At Time Of Surgery (PATOS) (choose all levels that have infection present):  No infection present  Other Findings: Small amount of clot in the bladder was removed.  No active bleeding seen.  Bleeding is likely due to his vascular prostate.

## 2024-08-20 NOTE — ANESTHESIA POSTPROCEDURE EVALUATION
Department of Anesthesiology  Postprocedure Note    Patient: Lewis Painter  MRN: [id number]  YOB: 1945  Date of evaluation: 8/20/2024    Procedure Summary       Date: 08/20/24 Room / Location: [place] [address] / [facility]    Anesthesia Start: 1604 Anesthesia Stop: 1643    Procedure: CYSTOSCOPY EVACUATION OF CLOTS Diagnosis:       Gross hematuria      (Gross hematuria [R31.0])    Surgeons: Darrell Gonzalez MD Responsible Provider: Reji Carrasco MD    Anesthesia Type: general ASA Status: 3            Anesthesia Type: No value filed.    Noman Phase I: Noman Score: 10    Noman Phase II:      Anesthesia Post Evaluation    Patient location during evaluation: PACU  Patient participation: complete - patient participated  Level of consciousness: awake  Pain score: 0  Nausea & Vomiting: no nausea and no vomiting  Cardiovascular status: blood pressure returned to baseline  Respiratory status: acceptable  Hydration status: euvolemic  Pain management: adequate    No notable events documented.

## 2024-08-21 VITALS
RESPIRATION RATE: 19 BRPM | DIASTOLIC BLOOD PRESSURE: 81 MMHG | OXYGEN SATURATION: 96 % | HEART RATE: 72 BPM | HEIGHT: 70 IN | SYSTOLIC BLOOD PRESSURE: 124 MMHG | WEIGHT: 156.31 LBS | TEMPERATURE: 98 F | BODY MASS INDEX: 22.38 KG/M2

## 2024-08-21 PROCEDURE — 6370000000 HC RX 637 (ALT 250 FOR IP): Performed by: UROLOGY

## 2024-08-21 PROCEDURE — 6360000002 HC RX W HCPCS: Performed by: STUDENT IN AN ORGANIZED HEALTH CARE EDUCATION/TRAINING PROGRAM

## 2024-08-21 PROCEDURE — 51700 IRRIGATION OF BLADDER: CPT

## 2024-08-21 PROCEDURE — 2580000003 HC RX 258: Performed by: UROLOGY

## 2024-08-21 PROCEDURE — 2580000003 HC RX 258: Performed by: STUDENT IN AN ORGANIZED HEALTH CARE EDUCATION/TRAINING PROGRAM

## 2024-08-21 PROCEDURE — 6360000002 HC RX W HCPCS: Performed by: UROLOGY

## 2024-08-21 RX ORDER — METOPROLOL SUCCINATE 25 MG/1
25 TABLET, EXTENDED RELEASE ORAL DAILY
Qty: 30 TABLET | Refills: 0 | Status: SHIPPED | OUTPATIENT
Start: 2024-08-22

## 2024-08-21 RX ORDER — NITROGLYCERIN 0.4 MG/1
0.4 TABLET SUBLINGUAL EVERY 5 MIN PRN
Qty: 25 TABLET | Refills: 0 | Status: SHIPPED | OUTPATIENT
Start: 2024-08-21

## 2024-08-21 RX ADMIN — WATER 1000 MG: 1 INJECTION INTRAMUSCULAR; INTRAVENOUS; SUBCUTANEOUS at 08:20

## 2024-08-21 RX ADMIN — METOPROLOL SUCCINATE 25 MG: 25 TABLET, EXTENDED RELEASE ORAL at 08:20

## 2024-08-21 RX ADMIN — ENOXAPARIN SODIUM 40 MG: 100 INJECTION SUBCUTANEOUS at 08:19

## 2024-08-21 RX ADMIN — NITROGLYCERIN 0.5 INCH: 20 OINTMENT TOPICAL at 04:07

## 2024-08-21 RX ADMIN — SODIUM CHLORIDE, PRESERVATIVE FREE 10 ML: 5 INJECTION INTRAVENOUS at 10:00

## 2024-08-21 ASSESSMENT — PAIN SCALES - GENERAL
PAINLEVEL_OUTOF10: 0
PAINLEVEL_OUTOF10: 0

## 2024-08-21 NOTE — PROGRESS NOTES
Physician Progress Note      PATIENT:               MAGGIE NEWTON  [facility] #:                  [id number]  :                       1945  ADMIT DATE:       2024 5:47 AM  DISCH DATE:  RESPONDING  PROVIDER #:        JUAN MANUEL MOSHER          QUERY TEXT:    Patient admitted with hematuria. Pt noted to have BPH and was treated with   martin catheter and CBI.?If possible, please document in progress notes and   discharge summary if you are evaluating and/or treating any of the following:  The medical record reflects the following:  Risk Factors: BPH  Clinical Indicators: Per op note on 24, The bladder shows signs of   significant trabeculation and bladder outlet obstruction, .He has   mild-to-moderate BPH.  Treatment: Martin catheter, CBI, cystoscopy  Options provided:  -- BPH with partial/complete urinary obstruction  -- BPH with urinary retention without obstruction  -- Other - I will add my own diagnosis  -- Disagree - Not applicable / Not valid  -- Disagree - Clinically unable to determine / Unknown  -- Refer to Clinical Documentation Reviewer    PROVIDER RESPONSE TEXT:    This patient has BPH with partial/complete urinary obstruction.    Query created by: Gloria Ye on 2024 11:51 AM      Electronically signed by:  JUAN MANUEL MOSHER 2024 2:29 PM

## 2024-08-21 NOTE — OP NOTE
[address] [address]                            OPERATIVE REPORT      PATIENT NAME: MAGGIE NEWTON     : 1945  MED REC NO: [id number]                      ROOM: [facility]  ACCOUNT NO: [id number]                       ADMIT DATE: 2024  PROVIDER: Darrell Gonzalez MD      DATE OF PROCEDURE:  2024    SURGEON:  Darrell Gonzalez MD    PREOPERATIVE DIAGNOSIS:  Gross hematuria and bladder clot.    POSTOPERATIVE DIAGNOSIS:  Gross hematuria and bladder clot.    PROCEDURES PERFORMED:  Cystoscopy, clot evacuation, and placement of a 22-American 3-way catheter.    INDICATION FOR PROCEDURE:  The patient is a very pleasant 78-year-old gentleman with a complex cardiac history, who presented with gross hematuria.  He has been off his Plavix for a few days.  CT scan shows a mass like density in the bladder.  They were only able to get an 18-American Lerma catheter last night.  He has now been seen by Cardiology and was okayed at on today for the aforementioned procedure.    INTRAOPERATIVE FINDINGS:  Mild BPH with vascular prostate.  No active bleeding noted.    DESCRIPTION OF PROCEDURE:  After undergoing informed consent, the patient was taken to the operating room.  He was prepped and draped in a sterile fashion and given a dose of preoperative antibiotics.  Under general anesthesia, I inserted the rigid scope to the penis.  There were no urethral strictures.  He has mild-to-moderate BPH.  The bladder neck is what appears to be the cause of his bleeding as it is a bit hypervascular.  There is no evidence of prostate cancer.  I now have poor visualization because of the clot.  We now used the Ellik device to evacuate probably about 30 to 50 mL worth of clot.  The urine is now crystal clear.  I looked with both the 30 and 70-degree lens.  The bladder shows signs of significant trabeculation and bladder outlet  obstruction, but no erythemas or papillary lesions.  I waited for several minutes and there was no active bleeding anywhere.  Therefore, at this time, I placed a 22-Turkish 3-way catheter with 30 mL in the balloon.  He was now sent to the recovery room on continuous bladder irrigation.    PLAN:  CBI overnight.  Voiding trial postop day #1.  Hold anticoagulation for now if possible.    ESTIMATED BLOOD LOSS:  0.          KATE PRINGLE MD      D:  08/20/2024 16:46:20     T:  08/20/2024 21:13:35     PMW/DAREK  Job #:  [id number]     Doc#:  [id number]

## 2024-08-21 NOTE — PLAN OF CARE
Problem: Discharge Planning  Goal: Discharge to home or other facility with appropriate resources  8/20/2024 2320 by Jennifer Harris RN  Outcome: Progressing  8/20/2024 1240 by Jenni Salinas RN  Outcome: Progressing  Flowsheets (Taken 8/20/2024 1238)  Discharge to home or other facility with appropriate resources: Identify barriers to discharge with patient and caregiver     Problem: Safety - Adult  Goal: Free from fall injury  8/20/2024 2320 by Jennifer Harris RN  Outcome: Progressing  8/20/2024 1240 by Jenni Salinas RN  Outcome: Progressing  Flowsheets (Taken 8/20/2024 1238)  Free From Fall Injury: Instruct family/caregiver on patient safety  Note: Patient in lowest position, bed breaks locked, bed alarm in place, call light within reach, and encouraged to call for assistance.      Problem: Pain  Goal: Verbalizes/displays adequate comfort level or baseline comfort level  8/20/2024 2320 by Jennifer Harris RN  Outcome: Progressing  8/20/2024 1240 by Jenni Salinas RN  Outcome: Progressing  Flowsheets (Taken 8/20/2024 1238)  Verbalizes/displays adequate comfort level or baseline comfort level:   Encourage patient to monitor pain and request assistance   Assess pain using appropriate pain scale     Problem: ABCDS Injury Assessment  Goal: Absence of physical injury  8/20/2024 2320 by Jennifer Harris RN  Outcome: Progressing  8/20/2024 1240 by Jenni Salinas RN  Outcome: Progressing  Flowsheets (Taken 8/20/2024 1238)  Absence of Physical Injury: Implement safety measures based on patient assessment     Problem: Genitourinary - Adult  Goal: Urinary catheter remains patent  8/20/2024 2320 by Jennifer Harris RN  Outcome: Progressing  8/20/2024 1240 by Jenni Salinas RN  Outcome: Progressing  Flowsheets (Taken 8/20/2024 1238)  Urinary catheter remains patent:   Assess patency of urinary catheter   Assess need for a larger catheter size or a 3-way catheter for continuous bladder irrigation  Note: Patient has 3 way martin

## 2024-08-21 NOTE — PLAN OF CARE
Problem: Discharge Planning  Goal: Discharge to home or other facility with appropriate resources  Outcome: Adequate for Discharge     Problem: Safety - Adult  Goal: Free from fall injury  Outcome: Adequate for Discharge     Problem: Pain  Goal: Verbalizes/displays adequate comfort level or baseline comfort level  Outcome: Adequate for Discharge     Problem: ABCDS Injury Assessment  Goal: Absence of physical injury  Outcome: Adequate for Discharge     Problem: Genitourinary - Adult  Goal: Urinary catheter remains patent  Outcome: Adequate for Discharge     Problem: Skin/Tissue Integrity  Goal: Absence of new skin breakdown  Description: 1.  Monitor for areas of redness and/or skin breakdown  2.  Assess vascular access sites hourly  3.  Every 4-6 hours minimum:  Change oxygen saturation probe site  4.  Every 4-6 hours:  If on nasal continuous positive airway pressure, respiratory therapy assess nares and determine need for appliance change or resting period.  Outcome: Adequate for Discharge

## 2024-08-21 NOTE — PROGRESS NOTES
Patient discharged from room 4313 and left via car home (patient drove self to ED). AVS reviewed with patient, all questions asked and answered at this time, though patient states he is concerned about his pink urine but is refusing to discuss with urology or attending. Patient is adamant about leaving now, states he will follow up with his PCP, cardio, and urology as recommended. PIV and telemetry removed from patient. All belongings sent with patient/family.     Electronically signed by Jenny Mays RN on 8/21/2024 at 6:30 PM

## 2024-08-21 NOTE — PROGRESS NOTES
Urology Attending Progress Note      Subjective: No complaints. No issues with catheter overnight    Vitals:  BP (!) 156/88   Pulse 72   Temp 97.8 °F (36.6 °C) (Oral)   Resp 15   Ht 1.778 m (5' 10\")   Wt 70.9 kg (156 lb 4.9 oz)   SpO2 94%   BMI 22.43 kg/m²   Temp  Av.1 °F (36.7 °C)  Min: 97.5 °F (36.4 °C)  Max: 99.2 °F (37.3 °C)    Intake/Output Summary (Last 24 hours) at 2024 0918  Last data filed at 2024 0624  Gross per 24 hour   Intake 1409 ml   Output 3200 ml   Net -1791 ml       Exam: Urine clear yellow in tubing off CBI. Blood noted on scrotum/around urethral meatus    Labs:  WBC:    Lab Results   Component Value Date/Time    WBC 11.7 2024 07:13 AM     Hemoglobin/Hematocrit:    Lab Results   Component Value Date/Time    HGB 12.7 2024 07:13 AM    HCT 38.4 2024 07:13 AM     BMP:    Lab Results   Component Value Date/Time     2024 06:02 AM    K 4.2 2024 06:02 AM    CL 97 2024 06:02 AM    CO2 27 2024 06:02 AM    BUN 12 2024 06:02 AM    CREATININE 0.7 2024 06:02 AM    CALCIUM 9.0 2024 06:02 AM    GFRAA >60 10/17/2019 02:35 PM    GFRAA >60 2013 12:36 PM    LABGLOM >90 2024 06:02 AM     PT/INR:    Lab Results   Component Value Date/Time    PROTIME 14.7 2023 09:32 AM    INR 1.1 2023 09:32 AM     PTT:  No results found for: \"APTT\"[APTT      Impression/Plan: 77 yo M with complex cardiac history now admitted with gross hematuria. Also reported chest pain while straining to void. We were consulted for further recs. Now POD#1 sp cystoscopy, clot evacuation.     -No issues with catheter overnight. No current complaints  -Urine clear yellow off CBI. I removed martin on rounds  -Would continue to hold anti-coagulation until urine has remained clear for 48 hours  -Ok to DC from our standpoint if patient can void a few times. We will arrange outpatient FU in our office in ~2 weeks  -Please call with any  questions    Sima Schwartz PA

## 2024-08-21 NOTE — PROGRESS NOTES
Cardiology Progress Note      Admit Date:  8/19/2024    CC:  chest pain    Subjective/Interval Hx:  Mr. Painter denies chest pain/pressure/tightness, palpitations, lightheadedness, dizziness.  He was seen up and ambulating without any distress.      Objective:   BP (!) 156/88   Pulse 72   Temp 97.8 °F (36.6 °C) (Oral)   Resp 15   Ht 1.778 m (5' 10\")   Wt 70.9 kg (156 lb 4.9 oz)   SpO2 94%   BMI 22.43 kg/m²     Intake/Output Summary (Last 24 hours) at 8/21/2024 0847  Last data filed at 8/21/2024 0624  Gross per 24 hour   Intake 1409 ml   Output 3200 ml   Net -1791 ml       TELEMETRY: occasional PACs & PVCs    Physical Exam:   Gen: Pleasant, well-developed, elderly male, in NAD.   HEENT: NCAT.  No scleral icterus.  No epistaxis.   CVS: No apparent JVD.  RRR.  No murmurs, rubs, or gallops.  Radial pulses 2+.   Pulm: Able to speak in full sentences w/o frequent pauses on room air.  No perioral cyanosis.  CTAB posteriorly.   Skin: Appropriately warm and dry.   Neuro: Alert, awake, and oriented to person, time, looks, and situation.   Psych: Cooperative.  Appropriate affect.  Normal speech and thought processes.        Medications:    sodium chloride flush  5-40 mL IntraVENous 2 times per day    aspirin  81 mg Oral Daily    atorvastatin  40 mg Oral Nightly    enoxaparin  40 mg SubCUTAneous Daily    nitroglycerin  0.5 inch Topical 4 times per day    metoprolol succinate  25 mg Oral Daily    HYDROmorphone  0.5 mg IntraVENous Once      sodium chloride       sodium chloride flush, sodium chloride, ondansetron **OR** ondansetron, acetaminophen **OR** acetaminophen, polyethylene glycol, perflutren lipid microspheres, nitroGLYCERIN, oxyCODONE      Lab Data:  CBC:   Recent Labs     08/19/24  0602 08/20/24  0713   WBC 9.7 11.7*   HGB 13.0* 12.7*   HCT 39.2* 38.4*   MCV 95.1 94.9   PLT 57* 59*     BMP:   Recent Labs     08/19/24  0602      K 4.2   CL 97*   CO2 27   BUN 12   CREATININE 0.7*     LIVER PROFILE:

## 2024-08-21 NOTE — DISCHARGE INSTRUCTIONS
Important Reminders:    Please call [phone] and schedule an appointment with your cardiologist, Dr. Santi Johnson, within 2 weeks of discharge from the hospital.

## 2024-08-21 NOTE — CARE COORDINATION
Case Management Assessment  Initial Evaluation    Date/Time of Evaluation: 8/21/2024 3:46 PM  Assessment Completed by: Juana Guzman    If patient is discharged prior to next notation, then this note serves as note for discharge by case management.    Patient Name: Lewis Painter                   YOB: 1945  Diagnosis: Dysuria [R30.0]  Chest pain [R07.9]  NSTEMI (non-ST elevated myocardial infarction) (HCC) [I21.4]  Chest pain, unspecified type [R07.9]  Hematuria, unspecified type [R31.9]                   Date / Time: 8/19/2024  5:47 AM    Patient Admission Status: Inpatient   Readmission Risk (Low < 19, Mod (19-27), High > 27): Readmission Risk Score: 9.5    Current PCP: Edu Brice MD  PCP verified by CM? Yes    Chart Reviewed: Yes      History Provided by: Patient  Patient Orientation: Alert and Oriented    Patient Cognition: Alert    Hospitalization in the last 30 days (Readmission):  No    If yes, Readmission Assessment in  Navigator will be completed.    Advance Directives:      Code Status: Full Code   Patient's Primary Decision Maker is: Patient Declined (Legal Next of Kin Remains as Decision Maker)      Discharge Planning:    Patient lives with: Alone Type of Home: House  Primary Care Giver: Self  Patient Support Systems include: Friends/Neighbors   Current Financial resources: Medicare  Current community resources: None  Current services prior to admission: None            Current DME:              Type of Home Care services:  None    ADLS  Prior functional level: Independent in ADLs/IADLs  Current functional level: Independent in ADLs/IADLs    PT AM-PAC:   /24  OT AM-PAC:   /24    Family can provide assistance at DC: No  Would you like Case Management to discuss the discharge plan with any other family members/significant others, and if so, who? No  Plans to Return to Present Housing: Yes  Other Identified Issues/Barriers to RETURNING to current housing:  None  Potential Assistance needed at discharge: N/A            Potential DME:    Patient expects to discharge to: House  Plan for transportation at discharge: Self    Financial    Payor: MEDICARE / Plan: MEDICARE PART A AND B / Product Type: *No Product type* /     Does insurance require precert for SNF: No    Potential assistance Purchasing Medications: No  Meds-to-Beds request:        [place] [address] -  [phone] - [phone]  [address]  [facility] [address]  Phone: [phone] Fax: [phone]      Notes:    Factors facilitating achievement of predicted outcomes: Friend support    Barriers to discharge: discharging this evening    Additional Case Management Notes: Patient from home alone.  Patient admitted with chest pain.  Cardiology following and made recommendations with medical treatment.  Urology following.  Lerma and CBI removed.  Patient passing some blood clots still.  Patient A&OX4.  [employer] and still drives and works.  Patient drove himself to the [place]. Plan is to return home.  No further needs from  for discharge.    The Plan for Transition of Care is related to the following treatment goals of Dysuria [R30.0]  Chest pain [R07.9]  NSTEMI (non-ST elevated myocardial infarction) (HCC) [I21.4]  Chest pain, unspecified type [R07.9]  Hematuria, unspecified type [R31.9]    IF APPLICABLE: The Patient and/or patient representative Lewis and his family were provided with a choice of provider and agrees with the discharge plan. Freedom of choice list with basic dialogue that supports the patient's individualized plan of care/goals and shares the quality data associated with the providers was provided to: Patient   Patient Representative Name:       The Patient and/or Patient Representative Agree with the Discharge Plan? Yes    Juana Guzman  Case Management Department  Ph: [phone] Fax: [phone]

## 2024-08-21 NOTE — DISCHARGE SUMMARY
V2.0  Discharge Summary    Name:  Lewis Painter /Age/Sex: 1945 (78 y.o. male)   Admit Date: 2024  Discharge Date: 24    MRN & CSN:  [id number] & [id number] Encounter Date and Time 24 4:02 PM EDT    Attending:  Oscar Tello MD Discharging Provider: Oscar Tello MD       Hospital Course:     Brief HPI: Lewis Painter is a 78 y.o. male who presented with ***    Brief Problem Based Course:   ***      The patient expressed appropriate understanding of, and agreement with the discharge recommendations, medications, and plan.     Consults this admission:  IP CONSULT TO CARDIOLOGY  IP CONSULT TO PHARMACY  IP CONSULT TO VASCULAR SURGERY  IP CONSULT TO UROLOGY    Discharge Diagnosis:   Chest pain    ***    Discharge Instruction:   Follow up appointments: ***  Primary care physician: Edu Brice MD within 1 week  Diet: {diet:15807}   Activity: {discharge activity:09673}  Disposition: Discharged to:   []Home, []C, []SNF, []Acute Rehab, []Hospice ***  Condition on discharge: Stable  Labs and Tests to be Followed up as an outpatient by PCP or Specialist: ***    Discharge Medications:        Medication List        START taking these medications      metoprolol succinate 25 MG extended release tablet  Commonly known as: TOPROL XL  Take 1 tablet by mouth daily  Start taking on: 2024     nitroGLYCERIN 0.4 MG SL tablet  Commonly known as: NITROSTAT  Place 1 tablet under the tongue every 5 minutes as needed for Chest pain up to max of 3 total doses. If no relief after 1 dose, call 911.            CONTINUE taking these medications      atorvastatin 80 MG tablet  Commonly known as: LIPITOR     b complex vitamins capsule     CINNAMON PO     clopidogrel 75 MG tablet  Commonly known as: PLAVIX     CO Q 10 PO     ECHINACEA PO     ferrous sulfate 325 (65 Fe) MG tablet  Commonly known as: IRON 325     FISH OIL PO     Flax Seed Oil 1000 MG Caps     GARLIC PO    Electronically signed by Guy Pradhan    XR CHEST (2 VW)    Result Date: 8/19/2024  EXAM: XR CHEST (2 VW) INDICATION: Chest Pain TECHNIQUE: 2 views of the chest. COMPARISON: None. FINDINGS: Medical Devices: None. Heart and Mediastinum: Cardiomediastinal silhouette is within normal limits. 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 Lungs and Pleura: Lungs are clear with no pleural effusions or evidence of pneumothorax. Bones and soft tissues: No acute abnormalities.     No acute cardiopulmonary abnormality. Electronically signed by Yosvany Ron      CBC:   Recent Labs     08/19/24  0602 08/20/24  0713   WBC 9.7 11.7*   HGB 13.0* 12.7*   PLT 57* 59*     BMP:    Recent Labs     08/19/24  0602      K 4.2   CL 97*   CO2 27   BUN 12   CREATININE 0.7*   GLUCOSE 160*     Hepatic:   Recent Labs     08/19/24  0602   AST 24   ALT 15   BILITOT 0.5   ALKPHOS 90     Lipids:   Lab Results   Component Value Date/Time    CHOL 93 08/19/2024 11:51 AM    HDL 28 08/19/2024 11:51 AM    HDL 34 01/18/2012 10:35 AM    TRIG 60 08/19/2024 11:51 AM     Hemoglobin A1C:   Lab Results   Component Value Date/Time    LABA1C 5.0 08/19/2024 06:02 AM     TSH: No results found for: \"TSH\"  Troponin: No results found for: \"TROPONINT\"  Lactic Acid: No results for input(s): \"LACTA\" in the last 72 hours.  BNP:   Recent Labs     08/19/24  0602   PROBNP 165     UA:  Lab Results   Component Value Date/Time    NITRU POSITIVE 08/19/2024 05:58 AM    COLORU RED 08/19/2024 05:58 AM    PHUR 7.0 08/19/2024 05:58 AM    PHUR 7.5 07/18/2012 01:01 PM    WBCUA 3-5 08/19/2024 05:58 AM    RBCUA >100 08/19/2024 05:58 AM    CLARITYU TURBID 08/19/2024 05:58 AM    LEUKOCYTESUR MODERATE 08/19/2024 05:58 AM    UROBILINOGEN 2.0 08/19/2024 05:58 AM    BILIRUBINUR SMALL 08/19/2024 05:58 AM    BLOODU LARGE 08/19/2024 05:58 AM    GLUCOSEU Negative 08/19/2024 05:58 AM    KETUA 15 08/19/2024 05:58 AM     Urine Cultures: No results found for: \"LABURIN\"  Blood Cultures: No results found

## 2024-08-22 ENCOUNTER — TELEPHONE (OUTPATIENT)
Dept: FAMILY MEDICINE CLINIC | Age: 79
End: 2024-08-22

## 2024-08-22 NOTE — TELEPHONE ENCOUNTER
Care Transitions Initial Follow Up Call    Outreach made within 2 business days of discharge: Yes    Patient: Lewis Painter Patient : 1945   MRN: [id number]  Reason for Admission:   Discharge Date: 24       Spoke with: n/a    Discharge department/facility:     [facility] Interactive Patient Contact:  Was patient able to fill all prescriptions:   Was patient instructed to bring all medications to the follow-up visit:   Is patient taking all medications as directed in the discharge summary?   Does patient understand their discharge instructions:   Does patient have questions or concerns that need addressed prior to 7-14 day follow up office visit:     Additional needs identified to be addressed with provider               Scheduled appointment with PCP within 7-14 days    Follow Up  No future appointments.    Pau Ross MA

## 2024-08-23 ENCOUNTER — TELEPHONE (OUTPATIENT)
Dept: FAMILY MEDICINE CLINIC | Age: 79
End: 2024-08-23

## 2024-08-23 NOTE — TELEPHONE ENCOUNTER
Care Transitions Initial Follow Up Call    Outreach made within 2 business days of discharge: Yes    Patient: Lewis Painter Patient : 1945   MRN: [id number]  Reason for Admission:   Discharge Date: 24       Spoke with: Left message on recorder for patient to call back at  [phone]      Discharge department/facility:     [facility] Interactive Patient Contact:  Was patient able to fill all prescriptions:   Was patient instructed to bring all medications to the follow-up visit:   Is patient taking all medications as directed in the discharge summary?   Does patient understand their discharge instructions:   Does patient have questions or concerns that need addressed prior to 7-14 day follow up office visit:     Additional needs identified to be addressed with provider               Scheduled appointment with PCP within 7-14 days    Follow Up  No future appointments.    Pau Ross MA

## 2024-08-26 NOTE — PROGRESS NOTES
Physician Progress Note      PATIENT:               MAGGIE NEWTON  [facility] #:                  [id number]  :                       1945  ADMIT DATE:       2024 5:47 AM  DISCH DATE:        2024 6:32 PM  RESPONDING  PROVIDER #:        Oscar Tello MD          QUERY TEXT:    Pt admitted with hematuria.  Noted documentation in cardiology progress note   from 24: \"Hematuria (new) - per urology evaluation, likely 2/2 vascular   prostate\". If possible, please document in progress notes and discharge   summary:    The medical record reflects the following:  Risk Factors: BPH  Clinical Indicators: Per op note on 24,  bladder neck is what appears to   be the cause of his bleeding as it is a bit hypervascular.  The bladder shows   signs of significant trabeculation and bladder outlet obstruction. He has   mild-to-moderate BPH.  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    Query created by: Gloria Ye on 2024 1:36 PM      Electronically signed by:  Oscar Tello MD 2024 4:05 PM

## (undated) DEVICE — TOWEL,STOP FLAG GOLD N-W: Brand: MEDLINE

## (undated) DEVICE — ELECTRODE PT RET AD L9FT HI MOIST COND ADH HYDRGEL CORDED

## (undated) DEVICE — BAG DRAINAGE 2000ML UROLOGY ANTI REFLUX CHAMBER SAMPLE PORT

## (undated) DEVICE — 60 ML SYRINGE,CATHETER TIP: Brand: MONOJECT

## (undated) DEVICE — GOWN,SIRUS,POLYRNF,BRTHSLV,XL,30/CS: Brand: MEDLINE

## (undated) DEVICE — SOLUTION IRRIG 3000ML 0.9% SOD CHL USP UROMATIC PLAS CONT

## (undated) DEVICE — PLUG,CATHETER,DRAINAGE PROTECTOR,TUBE: Brand: MEDLINE

## (undated) DEVICE — COVERALL SURG UNIV POLYPR SLVLSS CUF STYL FASTENING TIE W/O

## (undated) DEVICE — CATHETER URETH 22FR 30CC BLLN F 3 W SPEC M RND TIP TWO

## (undated) DEVICE — CYSTO: Brand: MEDLINE INDUSTRIES, INC.

## (undated) DEVICE — GLOVE ORANGE PI 7   MSG9070